# Patient Record
Sex: FEMALE | Race: WHITE | NOT HISPANIC OR LATINO | Employment: OTHER | URBAN - METROPOLITAN AREA
[De-identification: names, ages, dates, MRNs, and addresses within clinical notes are randomized per-mention and may not be internally consistent; named-entity substitution may affect disease eponyms.]

---

## 2023-02-28 LAB
ALBUMIN (G/DL) IN SER/PLAS: 4.2 G/DL (ref 3.4–5)
ANION GAP IN SER/PLAS: 10 MMOL/L (ref 10–20)
CALCIDIOL (25 OH VITAMIN D3) (NG/ML) IN SER/PLAS: 35 NG/ML
CALCIUM (MG/DL) IN SER/PLAS: 9.7 MG/DL (ref 8.6–10.6)
CARBON DIOXIDE, TOTAL (MMOL/L) IN SER/PLAS: 34 MMOL/L (ref 21–32)
CHLORIDE (MMOL/L) IN SER/PLAS: 104 MMOL/L (ref 98–107)
CREATININE (MG/DL) IN SER/PLAS: 1.29 MG/DL (ref 0.5–1.05)
CREATININE (MG/DL) IN URINE: 148 MG/DL (ref 20–320)
ERYTHROCYTE DISTRIBUTION WIDTH (RATIO) BY AUTOMATED COUNT: 13.2 % (ref 11.5–14.5)
ERYTHROCYTE MEAN CORPUSCULAR HEMOGLOBIN CONCENTRATION (G/DL) BY AUTOMATED: 33.3 G/DL (ref 32–36)
ERYTHROCYTE MEAN CORPUSCULAR VOLUME (FL) BY AUTOMATED COUNT: 98 FL (ref 80–100)
ERYTHROCYTES (10*6/UL) IN BLOOD BY AUTOMATED COUNT: 4.35 X10E12/L (ref 4–5.2)
GFR FEMALE: 40 ML/MIN/1.73M2
GLUCOSE (MG/DL) IN SER/PLAS: 95 MG/DL (ref 74–99)
HEMATOCRIT (%) IN BLOOD BY AUTOMATED COUNT: 42.7 % (ref 36–46)
HEMOGLOBIN (G/DL) IN BLOOD: 14.2 G/DL (ref 12–16)
LEUKOCYTES (10*3/UL) IN BLOOD BY AUTOMATED COUNT: 5.1 X10E9/L (ref 4.4–11.3)
MAGNESIUM (MG/DL) IN SER/PLAS: 2.03 MG/DL (ref 1.6–2.4)
NRBC (PER 100 WBCS) BY AUTOMATED COUNT: 0 /100 WBC (ref 0–0)
PARATHYRIN INTACT (PG/ML) IN SER/PLAS: 76 PG/ML (ref 18.5–88)
PHOSPHATE (MG/DL) IN SER/PLAS: 3.6 MG/DL (ref 2.5–4.9)
PLATELETS (10*3/UL) IN BLOOD AUTOMATED COUNT: 162 X10E9/L (ref 150–450)
POTASSIUM (MMOL/L) IN SER/PLAS: 4.5 MMOL/L (ref 3.5–5.3)
PROTEIN (MG/DL) IN URINE: 20 MG/DL (ref 5–24)
PROTEIN/CREATININE (MG/MG) IN URINE: 0.14 MG/MG CREAT (ref 0–0.17)
SODIUM (MMOL/L) IN SER/PLAS: 143 MMOL/L (ref 136–145)
UREA NITROGEN (MG/DL) IN SER/PLAS: 25 MG/DL (ref 6–23)

## 2023-06-02 ENCOUNTER — TELEPHONE (OUTPATIENT)
Dept: PRIMARY CARE | Facility: CLINIC | Age: 88
End: 2023-06-02
Payer: MEDICARE

## 2023-06-02 DIAGNOSIS — E03.9 HYPOTHYROIDISM, UNSPECIFIED TYPE: ICD-10-CM

## 2023-06-02 PROBLEM — R09.02 HYPOXIA: Status: ACTIVE | Noted: 2023-06-02

## 2023-06-02 PROBLEM — M25.811 SHOULDER IMPINGEMENT, RIGHT: Status: ACTIVE | Noted: 2023-06-02

## 2023-06-02 PROBLEM — I73.9 PERIPHERAL VASCULAR DISEASE (CMS-HCC): Status: ACTIVE | Noted: 2023-06-02

## 2023-06-02 PROBLEM — B34.9 VIRAL ILLNESS: Status: ACTIVE | Noted: 2023-06-02

## 2023-06-02 PROBLEM — J02.9 PHARYNGITIS: Status: ACTIVE | Noted: 2023-06-02

## 2023-06-02 PROBLEM — J01.00 ACUTE MAXILLARY SINUSITIS: Status: ACTIVE | Noted: 2023-06-02

## 2023-06-02 PROBLEM — U07.1 DISEASE DUE TO SEVERE ACUTE RESPIRATORY SYNDROME CORONAVIRUS 2 (SARS-COV-2): Status: ACTIVE | Noted: 2023-06-02

## 2023-06-02 PROBLEM — I48.91 ATRIAL FIBRILLATION (MULTI): Status: ACTIVE | Noted: 2023-06-02

## 2023-06-02 PROBLEM — M81.0 OSTEOPOROSIS: Status: ACTIVE | Noted: 2023-06-02

## 2023-06-02 PROBLEM — R60.0 LOCALIZED EDEMA: Status: ACTIVE | Noted: 2023-06-02

## 2023-06-02 PROBLEM — R06.09 DYSPNEA ON MINIMAL EXERTION: Status: ACTIVE | Noted: 2023-06-02

## 2023-06-02 PROBLEM — N39.0 ACUTE UTI: Status: ACTIVE | Noted: 2023-06-02

## 2023-06-02 PROBLEM — R51.9 PERSISTENT HEADACHES: Status: ACTIVE | Noted: 2023-06-02

## 2023-06-02 PROBLEM — G56.01 CARPAL TUNNEL SYNDROME OF RIGHT WRIST: Status: ACTIVE | Noted: 2023-06-02

## 2023-06-02 PROBLEM — I80.9 PHLEBITIS: Status: ACTIVE | Noted: 2023-06-02

## 2023-06-02 PROBLEM — M17.10 ARTHRITIS OF KNEE: Status: ACTIVE | Noted: 2023-06-02

## 2023-06-02 PROBLEM — R53.1 WEAKNESS: Status: ACTIVE | Noted: 2023-06-02

## 2023-06-02 PROBLEM — N18.32 STAGE 3B CHRONIC KIDNEY DISEASE (MULTI): Status: ACTIVE | Noted: 2023-06-02

## 2023-06-02 PROBLEM — R79.89 TSH ELEVATION: Status: ACTIVE | Noted: 2023-06-02

## 2023-06-02 PROBLEM — I10 ESSENTIAL HYPERTENSION: Status: ACTIVE | Noted: 2023-06-02

## 2023-06-02 PROBLEM — M25.552 LEFT HIP PAIN: Status: ACTIVE | Noted: 2023-06-02

## 2023-06-02 PROBLEM — N28.9 ABNORMAL KIDNEY FUNCTION: Status: ACTIVE | Noted: 2023-06-02

## 2023-06-02 PROBLEM — A04.72 C. DIFFICILE COLITIS: Status: ACTIVE | Noted: 2023-06-02

## 2023-06-02 PROBLEM — R05.9 COUGH: Status: ACTIVE | Noted: 2023-06-02

## 2023-06-02 PROBLEM — E78.5 HYPERLIPIDEMIA: Status: ACTIVE | Noted: 2023-06-02

## 2023-06-02 RX ORDER — ATORVASTATIN CALCIUM 10 MG/1
10 TABLET, FILM COATED ORAL DAILY
COMMUNITY
End: 2023-07-31 | Stop reason: SDUPTHER

## 2023-06-02 RX ORDER — LEVOTHYROXINE SODIUM 50 UG/1
50 TABLET ORAL DAILY
COMMUNITY
End: 2023-06-02 | Stop reason: SDUPTHER

## 2023-06-02 RX ORDER — ONDANSETRON 4 MG/1
TABLET, FILM COATED ORAL
COMMUNITY
Start: 2017-12-06 | End: 2023-12-05 | Stop reason: ALTCHOICE

## 2023-06-02 RX ORDER — FUROSEMIDE 40 MG/1
1 TABLET ORAL DAILY
COMMUNITY
Start: 2023-04-24

## 2023-06-02 RX ORDER — FOLIC ACID 1 MG/1
1 TABLET ORAL DAILY
COMMUNITY
End: 2023-07-31 | Stop reason: SDUPTHER

## 2023-06-02 RX ORDER — MULTIVITAMIN
1 TABLET ORAL DAILY
COMMUNITY
Start: 2019-10-07

## 2023-06-02 RX ORDER — MOLNUPIRAVIR 200 MG/1
CAPSULE ORAL
COMMUNITY
Start: 2022-09-28 | End: 2023-07-10 | Stop reason: ALTCHOICE

## 2023-06-02 RX ORDER — RIVAROXABAN 20 MG/1
1 TABLET, FILM COATED ORAL DAILY
COMMUNITY
Start: 2014-07-15 | End: 2023-07-10 | Stop reason: SDUPTHER

## 2023-06-02 RX ORDER — BETAMETHASONE DIPROPIONATE 0.5 MG/G
OINTMENT TOPICAL
COMMUNITY
Start: 2017-08-22

## 2023-06-02 RX ORDER — ACETAMINOPHEN 500 MG
1 TABLET ORAL DAILY
COMMUNITY
Start: 2019-10-07

## 2023-06-02 RX ORDER — AMLODIPINE BESYLATE 5 MG/1
5 TABLET ORAL DAILY
COMMUNITY
End: 2023-07-31 | Stop reason: SDUPTHER

## 2023-06-02 RX ORDER — LEVOTHYROXINE SODIUM 50 UG/1
50 TABLET ORAL DAILY
Qty: 90 TABLET | Refills: 1 | Status: SHIPPED | OUTPATIENT
Start: 2023-06-02 | End: 2023-08-29 | Stop reason: SDUPTHER

## 2023-06-02 RX ORDER — HYDROCODONE BITARTRATE AND ACETAMINOPHEN 5; 325 MG/1; MG/1
TABLET ORAL
COMMUNITY
Start: 2023-03-30 | End: 2023-07-10 | Stop reason: ALTCHOICE

## 2023-06-02 NOTE — TELEPHONE ENCOUNTER
Levothryoxine .05 mg 1 x a day  [facility] [address]  [facility] [phone]

## 2023-07-10 ENCOUNTER — OFFICE VISIT (OUTPATIENT)
Dept: PRIMARY CARE | Facility: CLINIC | Age: 88
End: 2023-07-10
Payer: MEDICARE

## 2023-07-10 VITALS
BODY MASS INDEX: 24.49 KG/M2 | RESPIRATION RATE: 18 BRPM | DIASTOLIC BLOOD PRESSURE: 68 MMHG | OXYGEN SATURATION: 96 % | WEIGHT: 147 LBS | HEART RATE: 65 BPM | HEIGHT: 65 IN | SYSTOLIC BLOOD PRESSURE: 130 MMHG

## 2023-07-10 DIAGNOSIS — G47.09 OTHER INSOMNIA: ICD-10-CM

## 2023-07-10 DIAGNOSIS — F41.9 ANXIETY: Primary | ICD-10-CM

## 2023-07-10 PROBLEM — J01.00 ACUTE MAXILLARY SINUSITIS: Status: RESOLVED | Noted: 2023-06-02 | Resolved: 2023-07-10

## 2023-07-10 PROBLEM — R06.09 DYSPNEA ON MINIMAL EXERTION: Status: RESOLVED | Noted: 2023-06-02 | Resolved: 2023-07-10

## 2023-07-10 PROBLEM — N39.0 ACUTE UTI: Status: RESOLVED | Noted: 2023-06-02 | Resolved: 2023-07-10

## 2023-07-10 PROBLEM — R05.9 COUGH: Status: RESOLVED | Noted: 2023-06-02 | Resolved: 2023-07-10

## 2023-07-10 PROBLEM — I10 ESSENTIAL HYPERTENSION: Status: RESOLVED | Noted: 2023-06-02 | Resolved: 2023-07-10

## 2023-07-10 PROBLEM — A04.72 C. DIFFICILE COLITIS: Status: RESOLVED | Noted: 2023-06-02 | Resolved: 2023-07-10

## 2023-07-10 PROCEDURE — 99213 OFFICE O/P EST LOW 20 MIN: CPT | Performed by: FAMILY MEDICINE

## 2023-07-10 PROCEDURE — 1159F MED LIST DOCD IN RCRD: CPT | Performed by: FAMILY MEDICINE

## 2023-07-10 PROCEDURE — 1160F RVW MEDS BY RX/DR IN RCRD: CPT | Performed by: FAMILY MEDICINE

## 2023-07-10 PROCEDURE — 1036F TOBACCO NON-USER: CPT | Performed by: FAMILY MEDICINE

## 2023-07-10 RX ORDER — HYDROXYZINE HYDROCHLORIDE 25 MG/1
25 TABLET, FILM COATED ORAL NIGHTLY
Qty: 7 TABLET | Refills: 0 | Status: SHIPPED | OUTPATIENT
Start: 2023-07-10 | End: 2023-12-05 | Stop reason: ALTCHOICE

## 2023-07-10 ASSESSMENT — ENCOUNTER SYMPTOMS
HALLUCINATIONS: 0
WEAKNESS: 0
EYE PAIN: 0
DIAPHORESIS: 0
CARDIOVASCULAR NEGATIVE: 1
RESPIRATORY NEGATIVE: 1
LIGHT-HEADEDNESS: 0
DIZZINESS: 0
NERVOUS/ANXIOUS: 1
SLEEPING PROBLEMS DURING THE LAST MONTH: 1
CONFUSION: 0
FATIGUE: 0
EYE DISCHARGE: 0
AGITATION: 0
CHILLS: 0

## 2023-07-10 ASSESSMENT — PATIENT HEALTH QUESTIONNAIRE - PHQ9
2. FEELING DOWN, DEPRESSED OR HOPELESS: NOT AT ALL
1. LITTLE INTEREST OR PLEASURE IN DOING THINGS: NOT AT ALL
SUM OF ALL RESPONSES TO PHQ9 QUESTIONS 1 & 2: 0

## 2023-07-10 NOTE — PATIENT INSTRUCTIONS
Discussed helping her daughter.     Trying to meet with primary care physician for her.    We will use Atarax as needed.    Recommend counseling if not improving.    If any troubles with increased anxiety or depression or other change, please call and let me know.

## 2023-07-10 NOTE — PROGRESS NOTES
"Subjective   Patient ID: Alexia Amaya is a 88 y.o. female who presents for Sleeping Problem (Daughters health status is effecting patients sleep).    Patient having difficult time with anxiety and with insomnia.  Daughter has been doing poorly she is an alcoholic smoking excessively not eating well.    This has been very bothersome and is affected her sleep.    She has had no significant depression no thoughts of suicide or self-harm states that sometimes she just cannot rest well gets approximately 5 hours at night.    She does not drink any alcohol.    Sleeping Problem  Pertinent negatives include no chills, diaphoresis, fatigue or weakness.    patient presents having troubles with sleep some troubles with anxiety.    Hard time with daughter.        Review of Systems   Constitutional:  Negative for chills, diaphoresis and fatigue.   Eyes:  Negative for pain and discharge.   Respiratory: Negative.     Cardiovascular: Negative.    Neurological:  Negative for dizziness, weakness and light-headedness.   Psychiatric/Behavioral:  Negative for agitation, confusion, hallucinations and suicidal ideas. The patient is nervous/anxious.        Objective   /68 (BP Location: Right arm, Patient Position: Sitting, BP Cuff Size: Adult)   Pulse 65   Resp 18   Ht 1.651 m (5' 5\")   Wt 66.7 kg (147 lb)   SpO2 96%   BMI 24.46 kg/m²   BSA Body surface area is 1.75 meters squared.      Physical Exam  Constitutional:       Appearance: Normal appearance.   Cardiovascular:      Rate and Rhythm: Normal rate.      Pulses: Normal pulses.   Pulmonary:      Effort: Pulmonary effort is normal.   Abdominal:      General: Abdomen is flat.   Neurological:      Mental Status: She is alert.   Psychiatric:         Mood and Affect: Mood normal.         Thought Content: Thought content normal.         Judgment: Judgment normal.       Orders Only on 02/28/2023   Component Date Value Ref Range Status    Protein, Ur 02/28/2023 20  5 - 24 mg/dL " Final    Creatinine, Urine 02/28/2023 148.0  20.0 - 320.0 mg/dL Final    Prot/Creat, Ur 02/28/2023 0.14  0.00 - 0.17 mg/mg Creat Final   Orders Only on 02/28/2023   Component Date Value Ref Range Status    Glucose 02/28/2023 95  74 - 99 mg/dL Final    Sodium 02/28/2023 143  136 - 145 mmol/L Final    Potassium 02/28/2023 4.5  3.5 - 5.3 mmol/L Final    Chloride 02/28/2023 104  98 - 107 mmol/L Final    Bicarbonate 02/28/2023 34 (H)  21 - 32 mmol/L Final    Anion Gap 02/28/2023 10  10 - 20 mmol/L Final    Urea Nitrogen 02/28/2023 25 (H)  6 - 23 mg/dL Final    Creatinine 02/28/2023 1.29 (H)  0.50 - 1.05 mg/dL Final    GFR Female 02/28/2023 40 (A)  >90 mL/min/1.73m2 Final    Comment:  CALCULATIONS OF ESTIMATED GFR ARE PERFORMED   USING THE 2021 CKD-EPI STUDY REFIT EQUATION   WITHOUT THE RACE VARIABLE FOR THE IDMS-TRACEABLE   CREATININE METHODS.    https://jasn.asnjournals.org/content/early/2021/09/22/ASN.8608721928      Calcium 02/28/2023 9.7  8.6 - 10.6 mg/dL Final    Phosphorus 02/28/2023 3.6  2.5 - 4.9 mg/dL Final    Comment:  The performance characteristics of phosphorus testing in   heparinized plasma have been validated by the individual     laboratory site where testing is performed. Testing    on heparinized plasma is not approved by the FDA;    however, such approval is not necessary.      Albumin 02/28/2023 4.2  3.4 - 5.0 g/dL Final   Orders Only on 02/28/2023   Component Date Value Ref Range Status    WBC 02/28/2023 5.1  4.4 - 11.3 x10E9/L Final    nRBC 02/28/2023 0.0  0.0 - 0.0 /100 WBC Final    RBC 02/28/2023 4.35  4.00 - 5.20 x10E12/L Final    Hemoglobin 02/28/2023 14.2  12.0 - 16.0 g/dL Final    Hematocrit 02/28/2023 42.7  36.0 - 46.0 % Final    MCV 02/28/2023 98  80 - 100 fL Final    MCHC 02/28/2023 33.3  32.0 - 36.0 g/dL Final    Platelets 02/28/2023 162  150 - 450 x10E9/L Final    RDW 02/28/2023 13.2  11.5 - 14.5 % Final   Orders Only on 02/28/2023   Component Date Value Ref Range Status    Magnesium  02/28/2023 2.03  1.60 - 2.40 mg/dL Final   Orders Only on 02/28/2023   Component Date Value Ref Range Status    PTH 02/28/2023 76.0  18.5 - 88.0 pg/mL Final   Orders Only on 02/28/2023   Component Date Value Ref Range Status    Vitamin D, 25-Hydroxy 02/28/2023 35  ng/mL Final    Comment: .  DEFICIENCY:         < 20   NG/ML  INSUFFICIENCY:      20-29  NG/ML  SUFFICIENCY:         NG/ML    THIS ASSAY ACCURATELY QUANTIFIES THE SUM OF  VITAMIN D3, 25-HYDROXY AND VIT D2,25-HYDROXY.     [facility]acy Encounter on 10/18/2022   Component Date Value Ref Range Status    Glucose 10/18/2022 95  74 - 99 mg/dL Final    Sodium 10/18/2022 143  136 - 145 mmol/L Final    Potassium 10/18/2022 4.9  3.5 - 5.3 mmol/L Final    Chloride 10/18/2022 105  98 - 107 mmol/L Final    Bicarbonate 10/18/2022 29  21 - 32 mmol/L Final    Anion Gap 10/18/2022 14  10 - 20 mmol/L Final    Urea Nitrogen 10/18/2022 25 (H)  6 - 23 mg/dL Final    Creatinine 10/18/2022 1.47 (H)  0.50 - 1.05 mg/dL Final    GFR Female 10/18/2022 34 (A)  >90 mL/min/1.73m2 Final    Comment:  CALCULATIONS OF ESTIMATED GFR ARE PERFORMED   USING THE 2021 CKD-EPI STUDY REFIT EQUATION   WITHOUT THE RACE VARIABLE FOR THE IDMS-TRACEABLE   CREATININE METHODS.    https://jasn.asnjournals.org/content/early/2021/09/22/ASN.3072117828      Calcium 10/18/2022 9.7  8.6 - 10.6 mg/dL Final    Albumin 10/18/2022 4.2  3.4 - 5.0 g/dL Final    Alkaline Phosphatase 10/18/2022 81  33 - 136 U/L Final    Total Protein 10/18/2022 7.0  6.4 - 8.2 g/dL Final    AST 10/18/2022 26  9 - 39 U/L Final    Total Bilirubin 10/18/2022 1.1  0.0 - 1.2 mg/dL Final    ALT (SGPT) 10/18/2022 15  7 - 45 U/L Final    Comment:  Patients treated with Sulfasalazine may generate    falsely decreased results for ALT.      Cholesterol 10/18/2022 176  0 - 199 mg/dL Final    Comment: .      AGE      DESIRABLE   BORDERLINE HIGH   HIGH     0-19 Y     0 - 169       170 - 199     >/= 200    20-24 Y     0 - 189       190 - 224     >/= 225          >24 Y     0 - 199       200 - 239     >/= 240   **All ranges are based on fasting samples. Specific   therapeutic targets will vary based on patient-specific   cardiac risk.  .   Pediatric guidelines reference:Pediatrics 2011, 128(S5).   Adult guidelines reference: NCEP ATPIII Guidelines,     JESIKA 2001, 258:2486-97  .   Venipuncture immediately after or during the    administration of Metamizole may lead to falsely   low results. Testing should be performed immediately   prior to Metamizole dosing.      HDL 10/18/2022 83.5  mg/dL Final    Comment: .      AGE      VERY LOW   LOW     NORMAL    HIGH       0-19 Y       < 35   < 40     40-45     ----    20-24 Y       ----   < 40       >45     ----      >24 Y       ----   < 40     40-60      >60  .      Cholesterol/HDL Ratio 10/18/2022 2.1   Final    Comment: REF VALUES  DESIRABLE  < 3.4  HIGH RISK  > 5.0      LDL 10/18/2022 77  0 - 99 mg/dL Final    Comment: .                           NEAR      BORD      AGE      DESIRABLE  OPTIMAL    HIGH     HIGH     VERY HIGH     0-19 Y     0 - 109     ---    110-129   >/= 130     ----    20-24 Y     0 - 119     ---    120-159   >/= 160     ----      >24 Y     0 -  99   100-129  130-159   160-189     >/=190  .      VLDL 10/18/2022 16  0 - 40 mg/dL Final    Triglycerides 10/18/2022 79  0 - 149 mg/dL Final    Comment: .      AGE      DESIRABLE   BORDERLINE HIGH   HIGH     VERY HIGH   0 D-90 D    19 - 174         ----         ----        ----  91 D- 9 Y     0 -  74        75 -  99     >/= 100      ----    10-19 Y     0 -  89        90 - 129     >/= 130      ----    20-24 Y     0 - 114       115 - 149     >/= 150      ----         >24 Y     0 - 149       150 - 199    200- 499    >/= 500  .   Venipuncture immediately after or during the    administration of Metamizole may lead to falsely   low results. Testing should be performed immediately   prior to Metamizole dosing.      WBC 10/18/2022 4.0 (L)  4.4 - 11.3 x10E9/L Final     nRBC 10/18/2022 0.0  0.0 - 0.0 /100 WBC Final    RBC 10/18/2022 4.28  4.00 - 5.20 x10E12/L Final    Hemoglobin 10/18/2022 14.1  12.0 - 16.0 g/dL Final    Hematocrit 10/18/2022 43.7  36.0 - 46.0 % Final    MCV 10/18/2022 102 (H)  80 - 100 fL Final    MCHC 10/18/2022 32.3  32.0 - 36.0 g/dL Final    Platelets 10/18/2022 156  150 - 450 x10E9/L Final    RDW 10/18/2022 13.4  11.5 - 14.5 % Final    Neutrophils % 10/18/2022 40.9  40.0 - 80.0 % Final    Immature Granulocytes %, Automated 10/18/2022 0.2  0.0 - 0.9 % Final    Comment:  Immature Granulocyte Count (IG) includes promyelocytes,    myelocytes and metamyelocytes but does not include bands.   Percent differential counts (%) should be interpreted in the   context of the absolute cell counts (cells/L).      Lymphocytes % 10/18/2022 36.1  13.0 - 44.0 % Final    Monocytes % 10/18/2022 11.4  2.0 - 10.0 % Final    Eosinophils % 10/18/2022 10.2  0.0 - 6.0 % Final    Basophils % 10/18/2022 1.2  0.0 - 2.0 % Final    Neutrophils Absolute 10/18/2022 1.64  1.60 - 5.50 x10E9/L Final    Lymphocytes Absolute 10/18/2022 1.45  0.80 - 3.00 x10E9/L Final    Monocytes Absolute 10/18/2022 0.46  0.05 - 0.80 x10E9/L Final    Eosinophils Absolute 10/18/2022 0.41 (H)  0.00 - 0.40 x10E9/L Final    Basophils Absolute 10/18/2022 0.05  0.00 - 0.10 x10E9/L Final    TSH 10/18/2022 3.32  0.44 - 3.98 mIU/L Final    Comment:  TSH testing is performed using different testing    methodology at [facility] than at other    [facility]. Direct result comparisons should    only be made within the same method.       Current Outpatient Medications on File Prior to Visit   Medication Sig Dispense Refill    amLODIPine (Norvasc) 5 mg tablet Take 1 tablet (5 mg) by mouth once daily.      atorvastatin (Lipitor) 10 mg tablet Take 1 tablet (10 mg) by mouth once daily.      betamethasone dipropionate (Diprolene) 0.05 % ointment APPLY SPARINGLY EXTERNALLY TO THE AFFECTED AREA TWICE DAILY       calcium carbonate-vitamin D3 600 mg-20 mcg (800 unit) tablet Take 1 tablet by mouth once daily.      folic acid (Folvite) 1 mg tablet Take 1 tablet (1 mg) by mouth once daily.      furosemide (Lasix) 40 mg tablet Take 1 tablet (40 mg) by mouth once daily.      levothyroxine (Synthroid, Levoxyl) 50 mcg tablet Take 1 tablet (50 mcg) by mouth once daily. 90 tablet 1    multivitamin (Multiple Vitamins) tablet Take 1 tablet by mouth once daily.      ondansetron (Zofran) 4 mg tablet Take by mouth.      rivaroxaban (Xarelto) 20 mg tablet Take 1 tablet (20 mg) by mouth once daily.      [DISCONTINUED] HYDROcodone-acetaminophen (Norco) 5-325 mg tablet TAKE 1 TO 2 TABLETS BY MOUTH EVERY FOUR  HOURS AS NEEDED FOR PAIN * DO NOT EXCEED 8 TABLETS IN 24 HOURS *      [DISCONTINUED] Lagevrio, EUA, 200 mg capsule Take by mouth.      [DISCONTINUED] Xarelto 20 mg tablet Take 1 tablet (20 mg) by mouth once daily.       No current facility-administered medications on file prior to visit.     No images are attached to the encounter.            Assessment/Plan   Problem List Items Addressed This Visit       Anxiety - Primary    Other insomnia    Relevant Medications    hydrOXYzine HCL (Atarax) 25 mg tablet

## 2023-07-31 DIAGNOSIS — I10 ESSENTIAL HYPERTENSION: ICD-10-CM

## 2023-07-31 DIAGNOSIS — E78.5 HYPERLIPIDEMIA, UNSPECIFIED HYPERLIPIDEMIA TYPE: ICD-10-CM

## 2023-07-31 DIAGNOSIS — Z00.00 HEALTH MAINTENANCE EXAMINATION: ICD-10-CM

## 2023-07-31 PROBLEM — I44.0 FIRST DEGREE ATRIOVENTRICULAR BLOCK: Status: ACTIVE | Noted: 2023-07-31

## 2023-07-31 PROBLEM — I48.21 PERMANENT ATRIAL FIBRILLATION (MULTI): Status: ACTIVE | Noted: 2023-06-02

## 2023-07-31 PROBLEM — I45.2 RIGHT BUNDLE BRANCH BLOCK WITH LEFT ANTERIOR FASCICULAR BLOCK: Status: ACTIVE | Noted: 2023-07-31

## 2023-07-31 PROBLEM — Z95.0 PRESENCE OF CARDIAC PACEMAKER: Status: ACTIVE | Noted: 2023-07-31

## 2023-07-31 PROBLEM — Z98.890 HISTORY OF ATRIOVENTRICULAR NODAL ABLATION: Status: ACTIVE | Noted: 2021-04-06

## 2023-07-31 RX ORDER — ATORVASTATIN CALCIUM 10 MG/1
10 TABLET, FILM COATED ORAL DAILY
Qty: 90 TABLET | Refills: 0 | Status: SHIPPED | OUTPATIENT
Start: 2023-07-31 | End: 2023-10-30

## 2023-07-31 RX ORDER — FOLIC ACID 1 MG/1
1 TABLET ORAL DAILY
Qty: 90 TABLET | Refills: 0 | Status: SHIPPED | OUTPATIENT
Start: 2023-07-31 | End: 2023-10-30

## 2023-07-31 RX ORDER — CA/D3/MAG OX/ZINC/COP/MANG/BOR 600 MG-800
1 TABLET,CHEWABLE ORAL
COMMUNITY

## 2023-07-31 RX ORDER — AMMONIUM LACTATE 12 G/100G
1 CREAM TOPICAL
COMMUNITY
Start: 2016-04-28

## 2023-07-31 RX ORDER — AMLODIPINE BESYLATE 5 MG/1
5 TABLET ORAL DAILY
Qty: 90 TABLET | Refills: 0 | Status: SHIPPED | OUTPATIENT
Start: 2023-07-31 | End: 2023-10-30

## 2023-08-29 DIAGNOSIS — E03.9 HYPOTHYROIDISM, UNSPECIFIED TYPE: ICD-10-CM

## 2023-08-29 RX ORDER — LEVOTHYROXINE SODIUM 50 UG/1
50 TABLET ORAL DAILY
Qty: 90 TABLET | Refills: 1 | Status: SHIPPED | OUTPATIENT
Start: 2023-08-29 | End: 2023-11-30

## 2023-09-07 LAB
ALBUMIN (G/DL) IN SER/PLAS: 4.4 G/DL (ref 3.4–5)
ALBUMIN (MG/L) IN URINE: 10.2 MG/L
ALBUMIN/CREATININE (UG/MG) IN URINE: 31.1 UG/MG CRT (ref 0–30)
ANION GAP IN SER/PLAS: 11 MMOL/L (ref 10–20)
CALCIDIOL (25 OH VITAMIN D3) (NG/ML) IN SER/PLAS: 44 NG/ML
CALCIUM (MG/DL) IN SER/PLAS: 9.9 MG/DL (ref 8.6–10.6)
CARBON DIOXIDE, TOTAL (MMOL/L) IN SER/PLAS: 30 MMOL/L (ref 21–32)
CHLORIDE (MMOL/L) IN SER/PLAS: 103 MMOL/L (ref 98–107)
CREATININE (MG/DL) IN SER/PLAS: 1.3 MG/DL (ref 0.5–1.05)
CREATININE (MG/DL) IN URINE: 32.8 MG/DL (ref 20–320)
ERYTHROCYTE DISTRIBUTION WIDTH (RATIO) BY AUTOMATED COUNT: 13.7 % (ref 11.5–14.5)
ERYTHROCYTE MEAN CORPUSCULAR HEMOGLOBIN CONCENTRATION (G/DL) BY AUTOMATED: 32.7 G/DL (ref 32–36)
ERYTHROCYTE MEAN CORPUSCULAR VOLUME (FL) BY AUTOMATED COUNT: 100 FL (ref 80–100)
ERYTHROCYTES (10*6/UL) IN BLOOD BY AUTOMATED COUNT: 4.23 X10E12/L (ref 4–5.2)
GFR FEMALE: 40 ML/MIN/1.73M2
GLUCOSE (MG/DL) IN SER/PLAS: 107 MG/DL (ref 74–99)
HEMATOCRIT (%) IN BLOOD BY AUTOMATED COUNT: 42.2 % (ref 36–46)
HEMOGLOBIN (G/DL) IN BLOOD: 13.8 G/DL (ref 12–16)
LEUKOCYTES (10*3/UL) IN BLOOD BY AUTOMATED COUNT: 5.8 X10E9/L (ref 4.4–11.3)
MAGNESIUM (MG/DL) IN SER/PLAS: 2.1 MG/DL (ref 1.6–2.4)
NRBC (PER 100 WBCS) BY AUTOMATED COUNT: 0 /100 WBC (ref 0–0)
PHOSPHATE (MG/DL) IN SER/PLAS: 4.1 MG/DL (ref 2.5–4.9)
PLATELETS (10*3/UL) IN BLOOD AUTOMATED COUNT: 173 X10E9/L (ref 150–450)
POTASSIUM (MMOL/L) IN SER/PLAS: 4.4 MMOL/L (ref 3.5–5.3)
SODIUM (MMOL/L) IN SER/PLAS: 140 MMOL/L (ref 136–145)
UREA NITROGEN (MG/DL) IN SER/PLAS: 27 MG/DL (ref 6–23)

## 2023-09-08 LAB — PARATHYRIN INTACT (PG/ML) IN SER/PLAS: 53.1 PG/ML (ref 18.5–88)

## 2023-10-09 ENCOUNTER — TELEPHONE (OUTPATIENT)
Dept: PRIMARY CARE | Facility: CLINIC | Age: 88
End: 2023-10-09
Payer: MEDICARE

## 2023-10-10 DIAGNOSIS — E03.9 HYPOTHYROIDISM, UNSPECIFIED TYPE: ICD-10-CM

## 2023-10-10 DIAGNOSIS — E78.5 HYPERLIPIDEMIA, UNSPECIFIED HYPERLIPIDEMIA TYPE: ICD-10-CM

## 2023-10-30 DIAGNOSIS — E78.5 HYPERLIPIDEMIA, UNSPECIFIED HYPERLIPIDEMIA TYPE: ICD-10-CM

## 2023-10-30 DIAGNOSIS — I10 ESSENTIAL HYPERTENSION: ICD-10-CM

## 2023-10-30 DIAGNOSIS — Z00.00 HEALTH MAINTENANCE EXAMINATION: ICD-10-CM

## 2023-10-30 RX ORDER — FOLIC ACID 1 MG/1
1 TABLET ORAL DAILY
Qty: 90 TABLET | Refills: 0 | Status: SHIPPED | OUTPATIENT
Start: 2023-10-30 | End: 2023-12-05 | Stop reason: SDUPTHER

## 2023-10-30 RX ORDER — AMLODIPINE BESYLATE 5 MG/1
5 TABLET ORAL DAILY
Qty: 90 TABLET | Refills: 0 | Status: SHIPPED | OUTPATIENT
Start: 2023-10-30 | End: 2023-12-05 | Stop reason: SDUPTHER

## 2023-10-30 RX ORDER — ATORVASTATIN CALCIUM 10 MG/1
10 TABLET, FILM COATED ORAL DAILY
Qty: 90 TABLET | Refills: 0 | Status: SHIPPED | OUTPATIENT
Start: 2023-10-30 | End: 2023-12-05 | Stop reason: SDUPTHER

## 2023-11-29 ENCOUNTER — LAB (OUTPATIENT)
Dept: LAB | Facility: LAB | Age: 88
End: 2023-11-29
Payer: MEDICARE

## 2023-11-29 DIAGNOSIS — E78.5 HYPERLIPIDEMIA, UNSPECIFIED HYPERLIPIDEMIA TYPE: ICD-10-CM

## 2023-11-29 DIAGNOSIS — E03.9 HYPOTHYROIDISM, UNSPECIFIED TYPE: ICD-10-CM

## 2023-11-29 LAB
ALBUMIN SERPL BCP-MCNC: 4.2 G/DL (ref 3.4–5)
ALP SERPL-CCNC: 62 U/L (ref 33–136)
ALT SERPL W P-5'-P-CCNC: 15 U/L (ref 7–45)
ANION GAP SERPL CALC-SCNC: 21 MMOL/L (ref 10–20)
AST SERPL W P-5'-P-CCNC: 30 U/L (ref 9–39)
BASOPHILS # BLD AUTO: 0.04 X10*3/UL (ref 0–0.1)
BASOPHILS NFR BLD AUTO: 1 %
BILIRUB SERPL-MCNC: 0.9 MG/DL (ref 0–1.2)
BUN SERPL-MCNC: 20 MG/DL (ref 6–23)
CALCIUM SERPL-MCNC: 9.5 MG/DL (ref 8.6–10.6)
CHLORIDE SERPL-SCNC: 104 MMOL/L (ref 98–107)
CHOLEST SERPL-MCNC: 180 MG/DL (ref 0–199)
CHOLESTEROL/HDL RATIO: 2.1
CO2 SERPL-SCNC: 19 MMOL/L (ref 21–32)
CREAT SERPL-MCNC: 1.32 MG/DL (ref 0.5–1.05)
EOSINOPHIL # BLD AUTO: 0.23 X10*3/UL (ref 0–0.4)
EOSINOPHIL NFR BLD AUTO: 5.9 %
ERYTHROCYTE [DISTWIDTH] IN BLOOD BY AUTOMATED COUNT: 13.3 % (ref 11.5–14.5)
GFR SERPL CREATININE-BSD FRML MDRD: 39 ML/MIN/1.73M*2
GLUCOSE SERPL-MCNC: 92 MG/DL (ref 74–99)
HCT VFR BLD AUTO: 43.9 % (ref 36–46)
HDLC SERPL-MCNC: 86 MG/DL
HGB BLD-MCNC: 13.6 G/DL (ref 12–16)
IMM GRANULOCYTES # BLD AUTO: 0.01 X10*3/UL (ref 0–0.5)
IMM GRANULOCYTES NFR BLD AUTO: 0.3 % (ref 0–0.9)
LDLC SERPL CALC-MCNC: 80 MG/DL
LYMPHOCYTES # BLD AUTO: 1.28 X10*3/UL (ref 0.8–3)
LYMPHOCYTES NFR BLD AUTO: 32.9 %
MCH RBC QN AUTO: 33.3 PG (ref 26–34)
MCHC RBC AUTO-ENTMCNC: 31 G/DL (ref 32–36)
MCV RBC AUTO: 107 FL (ref 80–100)
MONOCYTES # BLD AUTO: 0.37 X10*3/UL (ref 0.05–0.8)
MONOCYTES NFR BLD AUTO: 9.5 %
NEUTROPHILS # BLD AUTO: 1.96 X10*3/UL (ref 1.6–5.5)
NEUTROPHILS NFR BLD AUTO: 50.4 %
NON HDL CHOLESTEROL: 94 MG/DL (ref 0–149)
NRBC BLD-RTO: 0 /100 WBCS (ref 0–0)
PLATELET # BLD AUTO: 146 X10*3/UL (ref 150–450)
POTASSIUM SERPL-SCNC: 4.4 MMOL/L (ref 3.5–5.3)
PROT SERPL-MCNC: 6.7 G/DL (ref 6.4–8.2)
RBC # BLD AUTO: 4.09 X10*6/UL (ref 4–5.2)
SODIUM SERPL-SCNC: 140 MMOL/L (ref 136–145)
TRIGL SERPL-MCNC: 70 MG/DL (ref 0–149)
TSH SERPL-ACNC: 3.39 MIU/L (ref 0.44–3.98)
VLDL: 14 MG/DL (ref 0–40)
WBC # BLD AUTO: 3.9 X10*3/UL (ref 4.4–11.3)

## 2023-11-29 PROCEDURE — 80061 LIPID PANEL: CPT

## 2023-11-29 PROCEDURE — 36415 COLL VENOUS BLD VENIPUNCTURE: CPT

## 2023-11-29 PROCEDURE — 85025 COMPLETE CBC W/AUTO DIFF WBC: CPT

## 2023-11-29 PROCEDURE — 84443 ASSAY THYROID STIM HORMONE: CPT

## 2023-11-29 PROCEDURE — 80053 COMPREHEN METABOLIC PANEL: CPT

## 2023-11-30 DIAGNOSIS — E03.9 HYPOTHYROIDISM, UNSPECIFIED TYPE: ICD-10-CM

## 2023-11-30 RX ORDER — LEVOTHYROXINE SODIUM 50 UG/1
50 TABLET ORAL DAILY
Qty: 90 TABLET | Refills: 1 | Status: SHIPPED | OUTPATIENT
Start: 2023-11-30 | End: 2023-12-05 | Stop reason: SDUPTHER

## 2023-12-05 ENCOUNTER — LAB (OUTPATIENT)
Dept: LAB | Facility: LAB | Age: 88
End: 2023-12-05
Payer: MEDICARE

## 2023-12-05 ENCOUNTER — OFFICE VISIT (OUTPATIENT)
Dept: PRIMARY CARE | Facility: CLINIC | Age: 88
End: 2023-12-05
Payer: MEDICARE

## 2023-12-05 VITALS
SYSTOLIC BLOOD PRESSURE: 126 MMHG | WEIGHT: 147 LBS | RESPIRATION RATE: 16 BRPM | DIASTOLIC BLOOD PRESSURE: 79 MMHG | OXYGEN SATURATION: 94 % | BODY MASS INDEX: 24.49 KG/M2 | TEMPERATURE: 97.6 F | HEART RATE: 70 BPM | HEIGHT: 65 IN

## 2023-12-05 DIAGNOSIS — Z00.00 HEALTH MAINTENANCE EXAMINATION: ICD-10-CM

## 2023-12-05 DIAGNOSIS — Z00.00 MEDICARE ANNUAL WELLNESS VISIT, SUBSEQUENT: Primary | ICD-10-CM

## 2023-12-05 DIAGNOSIS — D69.6 THROMBOCYTOPENIA (CMS-HCC): ICD-10-CM

## 2023-12-05 DIAGNOSIS — N18.32 STAGE 3B CHRONIC KIDNEY DISEASE (MULTI): ICD-10-CM

## 2023-12-05 DIAGNOSIS — E03.9 HYPOTHYROIDISM, UNSPECIFIED TYPE: ICD-10-CM

## 2023-12-05 DIAGNOSIS — Z95.0 PRESENCE OF CARDIAC PACEMAKER: ICD-10-CM

## 2023-12-05 DIAGNOSIS — Z71.89 COUNSELING ON HEALTH PROMOTION AND DISEASE PREVENTION: ICD-10-CM

## 2023-12-05 DIAGNOSIS — E78.5 HYPERLIPIDEMIA, UNSPECIFIED HYPERLIPIDEMIA TYPE: ICD-10-CM

## 2023-12-05 DIAGNOSIS — I73.9 PERIPHERAL VASCULAR DISEASE (CMS-HCC): ICD-10-CM

## 2023-12-05 DIAGNOSIS — I45.2 RIGHT BUNDLE BRANCH BLOCK WITH LEFT ANTERIOR FASCICULAR BLOCK: ICD-10-CM

## 2023-12-05 DIAGNOSIS — I10 ESSENTIAL HYPERTENSION: ICD-10-CM

## 2023-12-05 DIAGNOSIS — R60.0 LOCALIZED EDEMA: ICD-10-CM

## 2023-12-05 DIAGNOSIS — M81.0 OSTEOPOROSIS, UNSPECIFIED OSTEOPOROSIS TYPE, UNSPECIFIED PATHOLOGICAL FRACTURE PRESENCE: ICD-10-CM

## 2023-12-05 DIAGNOSIS — Z71.89 ADVANCE DIRECTIVE DISCUSSED WITH PATIENT: ICD-10-CM

## 2023-12-05 DIAGNOSIS — I48.21 PERMANENT ATRIAL FIBRILLATION (MULTI): ICD-10-CM

## 2023-12-05 PROBLEM — J02.9 PHARYNGITIS: Status: RESOLVED | Noted: 2023-06-02 | Resolved: 2023-12-05

## 2023-12-05 PROBLEM — B34.9 VIRAL ILLNESS: Status: RESOLVED | Noted: 2023-06-02 | Resolved: 2023-12-05

## 2023-12-05 PROBLEM — M25.811 SHOULDER IMPINGEMENT, RIGHT: Status: RESOLVED | Noted: 2023-06-02 | Resolved: 2023-12-05

## 2023-12-05 PROBLEM — R51.9 PERSISTENT HEADACHES: Status: RESOLVED | Noted: 2023-06-02 | Resolved: 2023-12-05

## 2023-12-05 PROBLEM — N28.9 ABNORMAL KIDNEY FUNCTION: Status: RESOLVED | Noted: 2023-06-02 | Resolved: 2023-12-05

## 2023-12-05 PROBLEM — U07.1 DISEASE DUE TO SEVERE ACUTE RESPIRATORY SYNDROME CORONAVIRUS 2 (SARS-COV-2): Status: RESOLVED | Noted: 2023-06-02 | Resolved: 2023-12-05

## 2023-12-05 PROBLEM — I80.9 PHLEBITIS: Status: RESOLVED | Noted: 2023-06-02 | Resolved: 2023-12-05

## 2023-12-05 PROBLEM — R79.89 TSH ELEVATION: Status: RESOLVED | Noted: 2023-06-02 | Resolved: 2023-12-05

## 2023-12-05 PROBLEM — R53.1 WEAKNESS: Status: RESOLVED | Noted: 2023-06-02 | Resolved: 2023-12-05

## 2023-12-05 PROBLEM — M25.552 LEFT HIP PAIN: Status: RESOLVED | Noted: 2023-06-02 | Resolved: 2023-12-05

## 2023-12-05 PROBLEM — R09.02 HYPOXIA: Status: RESOLVED | Noted: 2023-06-02 | Resolved: 2023-12-05

## 2023-12-05 PROCEDURE — G0439 PPPS, SUBSEQ VISIT: HCPCS | Performed by: NURSE PRACTITIONER

## 2023-12-05 PROCEDURE — G0444 DEPRESSION SCREEN ANNUAL: HCPCS | Performed by: NURSE PRACTITIONER

## 2023-12-05 PROCEDURE — 3074F SYST BP LT 130 MM HG: CPT | Performed by: NURSE PRACTITIONER

## 2023-12-05 PROCEDURE — 1160F RVW MEDS BY RX/DR IN RCRD: CPT | Performed by: NURSE PRACTITIONER

## 2023-12-05 PROCEDURE — 1159F MED LIST DOCD IN RCRD: CPT | Performed by: NURSE PRACTITIONER

## 2023-12-05 PROCEDURE — 99397 PER PM REEVAL EST PAT 65+ YR: CPT | Performed by: NURSE PRACTITIONER

## 2023-12-05 PROCEDURE — 36415 COLL VENOUS BLD VENIPUNCTURE: CPT

## 2023-12-05 PROCEDURE — 3078F DIAST BP <80 MM HG: CPT | Performed by: NURSE PRACTITIONER

## 2023-12-05 PROCEDURE — 99497 ADVNCD CARE PLAN 30 MIN: CPT | Performed by: NURSE PRACTITIONER

## 2023-12-05 PROCEDURE — 1036F TOBACCO NON-USER: CPT | Performed by: NURSE PRACTITIONER

## 2023-12-05 PROCEDURE — 1170F FXNL STATUS ASSESSED: CPT | Performed by: NURSE PRACTITIONER

## 2023-12-05 PROCEDURE — 1158F ADVNC CARE PLAN TLK DOCD: CPT | Performed by: NURSE PRACTITIONER

## 2023-12-05 RX ORDER — ATORVASTATIN CALCIUM 10 MG/1
10 TABLET, FILM COATED ORAL DAILY
Qty: 90 TABLET | Refills: 3 | Status: SHIPPED | OUTPATIENT
Start: 2023-12-05

## 2023-12-05 RX ORDER — FOLIC ACID 1 MG/1
1 TABLET ORAL DAILY
Qty: 90 TABLET | Refills: 3 | Status: SHIPPED | OUTPATIENT
Start: 2023-12-05

## 2023-12-05 RX ORDER — AMLODIPINE BESYLATE 5 MG/1
5 TABLET ORAL DAILY
Qty: 90 TABLET | Refills: 3 | Status: SHIPPED | OUTPATIENT
Start: 2023-12-05

## 2023-12-05 RX ORDER — LEVOTHYROXINE SODIUM 50 UG/1
50 TABLET ORAL DAILY
Qty: 90 TABLET | Refills: 3 | Status: SHIPPED | OUTPATIENT
Start: 2023-12-05

## 2023-12-05 ASSESSMENT — ACTIVITIES OF DAILY LIVING (ADL)
TAKING_MEDICATION: INDEPENDENT
BATHING: INDEPENDENT
DOING_HOUSEWORK: INDEPENDENT
DRESSING: INDEPENDENT
GROCERY_SHOPPING: INDEPENDENT
MANAGING_FINANCES: INDEPENDENT

## 2023-12-05 ASSESSMENT — ENCOUNTER SYMPTOMS
EYE PAIN: 0
APPETITE CHANGE: 0
FREQUENCY: 0
LOSS OF SENSATION IN FEET: 0
NERVOUS/ANXIOUS: 0
OCCASIONAL FEELINGS OF UNSTEADINESS: 0
SHORTNESS OF BREATH: 0
EYE ITCHING: 0
NUMBNESS: 0
VOMITING: 0
CONSTIPATION: 0
DEPRESSION: 0
ABDOMINAL DISTENTION: 0
WHEEZING: 0
PALPITATIONS: 0
WOUND: 0
DIARRHEA: 0
DYSURIA: 0
HEMATURIA: 0
ARTHRALGIAS: 0
COUGH: 0
ACTIVITY CHANGE: 0

## 2023-12-05 ASSESSMENT — PATIENT HEALTH QUESTIONNAIRE - PHQ9
2. FEELING DOWN, DEPRESSED OR HOPELESS: NOT AT ALL
10. IF YOU CHECKED OFF ANY PROBLEMS, HOW DIFFICULT HAVE THESE PROBLEMS MADE IT FOR YOU TO DO YOUR WORK, TAKE CARE OF THINGS AT HOME, OR GET ALONG WITH OTHER PEOPLE: NOT DIFFICULT AT ALL
1. LITTLE INTEREST OR PLEASURE IN DOING THINGS: NOT AT ALL
SUM OF ALL RESPONSES TO PHQ9 QUESTIONS 1 AND 2: 0

## 2023-12-05 NOTE — ASSESSMENT & PLAN NOTE
Healthcare Maintenance:  - Tdap: 2014 due 2024  - Shingles: Recommended -- she plans to get this after the holidays.   - Flu: complete   - PCV 20: 2015  - Dexa scan: ordered       Social History:  - Occupation: Retired   - Alcohol: none   - Caffeine: 1 cup daily   - Nicotine: none, never   - Recreational drugs: none   - Exercise: yes, workout gym - water aerobics 3 times per week   - Diet: most meals in the home - home cooked meals     Specialist  - Nephrologist- Dr. Fonseca   - EP: Dr. Nagel   - Cardiologist: Dr. Quiroz  - Opthomologist: Dr. Camarena   - Dentist

## 2023-12-05 NOTE — ASSESSMENT & PLAN NOTE
Pacemaker in place  Follows with EP Dr. Robe Quiroz cardiology   [facility]

## 2023-12-05 NOTE — PATIENT INSTRUCTIONS
Continue medications as prescribed  Schedule Dexa scan   Referral  to hematology/oncology given today to work up your blood work for your low platelet count and WBC count   Follow up for annual physical exam

## 2023-12-05 NOTE — PROGRESS NOTES
Subjective   Chief Complaint: Medicare Annual Wellness Visit Subsequent.    HPI   Alexia Amaya is a 88 y.o. female who presents for Medicare Annual Wellness Visit Subsequent.      Patient reports pain and swelling of her 3rd finger on her right hand. She reports that is the finger she uses for everything. The pain is worse in the evening after day of using it and stiffness is felt in the morning. There is no warmth or redness. She has tried heat with some relief but has not tried ice. Discussed that this is likely arthritis -- patient is not interested in doing xray at this time.    Discussed BW with patient. WBC and platelets are low -- will repeat and referral placed to Heme/onc for further workup. Patient agreeable with plan.     Patient denies fever, chills, nausea, vomiting, diarrhea, chest pain, heart palpations, or shortness of breath.       Healthcare Maintenance:  - Tdap: 2014 due 2024  - Shingles: Recommended -- she plans to get this after the holidays.   - Flu: complete   - PCV 20: 2015  - Dexa scan: ordered       Social History:  - Occupation: Retired   - Alcohol: none   - Caffeine: 1 cup daily   - Nicotine: none, never   - Recreational drugs: none   - Exercise: yes, workout gym - water aerobics 3 times per week   - Diet: most meals in the home - home cooked meals     Specialist  - Nephrologist- Dr. Fonseca   - EP: Dr. Nagel   - Cardiologist: Dr. Quiroz  - Opthomologist: Dr. Camarena   - Dentist       The ASCVD Risk score (Dione DK, et al., 2019) failed to calculate for the following reasons:    The 2019 ASCVD risk score is only valid for ages 40 to 79      Review of Systems   Constitutional:  Negative for activity change and appetite change.   HENT:  Negative for congestion.    Eyes:  Negative for pain and itching.   Respiratory:  Negative for cough, shortness of breath and wheezing.    Cardiovascular:  Negative for chest pain, palpitations and leg swelling.   Gastrointestinal:  Negative for abdominal  "distention, constipation, diarrhea and vomiting.   Genitourinary:  Negative for dysuria, frequency, hematuria and urgency.   Musculoskeletal:  Negative for arthralgias and gait problem.        Right index finger swelling and pain    Skin:  Negative for rash and wound.   Neurological:  Negative for numbness.   Psychiatric/Behavioral:  The patient is not nervous/anxious.        Objective   /79 (BP Location: Left arm, Patient Position: Sitting, BP Cuff Size: Adult)   Pulse 70   Temp 36.4 °C (97.6 °F)   Resp 16   Ht 1.651 m (5' 5\")   Wt 66.7 kg (147 lb)   SpO2 94%   BMI 24.46 kg/m²   BSA Body surface area is 1.75 meters squared.      Physical Exam  Constitutional:       Appearance: Normal appearance.   HENT:      Head: Normocephalic.      Right Ear: Tympanic membrane and external ear normal.      Left Ear: Tympanic membrane and external ear normal.      Nose: Nose normal.      Mouth/Throat:      Mouth: Mucous membranes are moist.      Pharynx: Oropharynx is clear.   Eyes:      Extraocular Movements: Extraocular movements intact.      Conjunctiva/sclera: Conjunctivae normal.      Pupils: Pupils are equal, round, and reactive to light.   Cardiovascular:      Rate and Rhythm: Normal rate. Rhythm irregular.      Pulses: Normal pulses.      Heart sounds: Normal heart sounds.   Pulmonary:      Effort: Pulmonary effort is normal.      Breath sounds: Normal breath sounds.   Abdominal:      General: Bowel sounds are normal.      Palpations: Abdomen is soft.   Musculoskeletal:      Cervical back: Normal range of motion.      Right lower leg: No edema.      Left lower leg: No edema.      Comments: Right middle finger swelling    Skin:     General: Skin is warm and dry.   Neurological:      General: No focal deficit present.      Mental Status: She is alert and oriented to person, place, and time.   Psychiatric:         Mood and Affect: Mood is not anxious or depressed.       Lab on 11/29/2023   Component Date Value " Ref Range Status    WBC 11/29/2023 3.9 (L)  4.4 - 11.3 x10*3/uL Final    nRBC 11/29/2023 0.0  0.0 - 0.0 /100 WBCs Final    RBC 11/29/2023 4.09  4.00 - 5.20 x10*6/uL Final    Hemoglobin 11/29/2023 13.6  12.0 - 16.0 g/dL Final    Hematocrit 11/29/2023 43.9  36.0 - 46.0 % Final    MCV 11/29/2023 107 (H)  80 - 100 fL Final    MCH 11/29/2023 33.3  26.0 - 34.0 pg Final    MCHC 11/29/2023 31.0 (L)  32.0 - 36.0 g/dL Final    RDW 11/29/2023 13.3  11.5 - 14.5 % Final    Platelets 11/29/2023 146 (L)  150 - 450 x10*3/uL Final    Neutrophils % 11/29/2023 50.4  40.0 - 80.0 % Final    Immature Granulocytes %, Automated 11/29/2023 0.3  0.0 - 0.9 % Final    Immature Granulocyte Count (IG) includes promyelocytes, myelocytes and metamyelocytes but does not include bands. Percent differential counts (%) should be interpreted in the context of the absolute cell counts (cells/UL).    Lymphocytes % 11/29/2023 32.9  13.0 - 44.0 % Final    Monocytes % 11/29/2023 9.5  2.0 - 10.0 % Final    Eosinophils % 11/29/2023 5.9  0.0 - 6.0 % Final    Basophils % 11/29/2023 1.0  0.0 - 2.0 % Final    Neutrophils Absolute 11/29/2023 1.96  1.60 - 5.50 x10*3/uL Final    Percent differential counts (%) should be interpreted in the context of the absolute cell counts (cells/uL).    Immature Granulocytes Absolute, Au* 11/29/2023 0.01  0.00 - 0.50 x10*3/uL Final    Lymphocytes Absolute 11/29/2023 1.28  0.80 - 3.00 x10*3/uL Final    Monocytes Absolute 11/29/2023 0.37  0.05 - 0.80 x10*3/uL Final    Eosinophils Absolute 11/29/2023 0.23  0.00 - 0.40 x10*3/uL Final    Basophils Absolute 11/29/2023 0.04  0.00 - 0.10 x10*3/uL Final    Glucose 11/29/2023 92  74 - 99 mg/dL Final    Sodium 11/29/2023 140  136 - 145 mmol/L Final    Potassium 11/29/2023 4.4  3.5 - 5.3 mmol/L Final    MILD HEMOLYSIS DETECTED. The result may be falsely elevated due to hemolysis or other interferents. Clinical correlation is recommended. Repeat testing may be considered.    Chloride  11/29/2023 104  98 - 107 mmol/L Final    Bicarbonate 11/29/2023 19 (L)  21 - 32 mmol/L Final    Anion Gap 11/29/2023 21 (H)  10 - 20 mmol/L Final    Urea Nitrogen 11/29/2023 20  6 - 23 mg/dL Final    Creatinine 11/29/2023 1.32 (H)  0.50 - 1.05 mg/dL Final    eGFR 11/29/2023 39 (L)  >60 mL/min/1.73m*2 Final    Calculations of estimated GFR are performed using the 2021 CKD-EPI Study Refit equation without the race variable for the IDMS-Traceable creatinine methods.  https://jasn.asnjournals.org/content/early/2021/09/22/ASN.7121420476    Calcium 11/29/2023 9.5  8.6 - 10.6 mg/dL Final    Albumin 11/29/2023 4.2  3.4 - 5.0 g/dL Final    Alkaline Phosphatase 11/29/2023 62  33 - 136 U/L Final    Total Protein 11/29/2023 6.7  6.4 - 8.2 g/dL Final    AST 11/29/2023 30  9 - 39 U/L Final    MILD HEMOLYSIS DETECTED. The result may be falsely elevated due to hemolysis or other interferents. Clinical correlation is recommended. Repeat testing may be considered.    Bilirubin, Total 11/29/2023 0.9  0.0 - 1.2 mg/dL Final    ALT 11/29/2023 15  7 - 45 U/L Final    Patients treated with Sulfasalazine may generate falsely decreased results for ALT.    Cholesterol 11/29/2023 180  0 - 199 mg/dL Final          Age      Desirable   Borderline High   High     0-19 Y     0 - 169       170 - 199     >/= 200    20-24 Y     0 - 189       190 - 224     >/= 225         >24 Y     0 - 199       200 - 239     >/= 240   **All ranges are based on fasting samples. Specific   therapeutic targets will vary based on patient-specific   cardiac risk.    Pediatric guidelines reference:Pediatrics 2011, 128(S5).Adult guidelines reference: NCEP ATPIII Guidelines,JESIKA 2001, 258:2486-97    Venipuncture immediately after or during the administration of Metamizole may lead to falsely low results. Testing should be performed immediately prior to Metamizole dosing.    HDL-Cholesterol 11/29/2023 86.0  mg/dL Final      Age       Very Low   Low     Normal    High    0-19  Y    < 35      < 40     40-45     ----  20-24 Y    ----     < 40      >45      ----        >24 Y      ----     < 40     40-60      >60      Cholesterol/HDL Ratio 11/29/2023 2.1   Final      Ref Values  Desirable  < 3.4  High Risk  > 5.0    LDL Calculated 11/29/2023 80  <=99 mg/dL Final                                Near   Borderline      AGE      Desirable  Optimal    High     High     Very High     0-19 Y     0 - 109     ---    110-129   >/= 130     ----    20-24 Y     0 - 119     ---    120-159   >/= 160     ----      >24 Y     0 -  99   100-129  130-159   160-189     >/=190      VLDL 11/29/2023 14  0 - 40 mg/dL Final    Triglycerides 11/29/2023 70  0 - 149 mg/dL Final       Age         Desirable   Borderline High   High     Very High   0 D-90 D    19 - 174         ----         ----        ----  91 D- 9 Y     0 -  74        75 -  99     >/= 100      ----    10-19 Y     0 -  89        90 - 129     >/= 130      ----    20-24 Y     0 - 114       115 - 149     >/= 150      ----         >24 Y     0 - 149       150 - 199    200- 499    >/= 500    Venipuncture immediately after or during the administration of Metamizole may lead to falsely low results. Testing should be performed immediately prior to Metamizole dosing.    Non HDL Cholesterol 11/29/2023 94  0 - 149 mg/dL Final          Age       Desirable   Borderline High   High     Very High     0-19 Y     0 - 119       120 - 144     >/= 145    >/= 160    20-24 Y     0 - 149       150 - 189     >/= 190      ----         >24 Y    30 mg/dL above LDL Cholesterol goal      Thyroid Stimulating Hormone 11/29/2023 3.39  0.44 - 3.98 mIU/L Final   Orders Only on 09/07/2023   Component Date Value Ref Range Status    Vitamin D, 25-Hydroxy 09/07/2023 44  ng/mL Final    Comment: .  DEFICIENCY:         < 20   NG/ML  INSUFFICIENCY:      20-29  NG/ML  SUFFICIENCY:         NG/ML    THIS ASSAY ACCURATELY QUANTIFIES THE SUM OF  VITAMIN D3, 25-HYDROXY AND VIT D2,25-HYDROXY.      PTH  09/07/2023 53.1  18.5 - 88.0 pg/mL Final    Magnesium 09/07/2023 2.10  1.60 - 2.40 mg/dL Final    ALBUMIN (MG/L) IN URINE 09/07/2023 10.2  Not Established mg/L Final    Albumin/Creatine Ratio 09/07/2023 31.1 (H)  0.0 - 30.0 ug/mg crt Final    Creatinine, Urine 09/07/2023 32.8  20.0 - 320.0 mg/dL Final    Glucose 09/07/2023 107 (H)  74 - 99 mg/dL Final    Sodium 09/07/2023 140  136 - 145 mmol/L Final    Potassium 09/07/2023 4.4  3.5 - 5.3 mmol/L Final    Chloride 09/07/2023 103  98 - 107 mmol/L Final    Bicarbonate 09/07/2023 30  21 - 32 mmol/L Final    Anion Gap 09/07/2023 11  10 - 20 mmol/L Final    Urea Nitrogen 09/07/2023 27 (H)  6 - 23 mg/dL Final    Creatinine 09/07/2023 1.30 (H)  0.50 - 1.05 mg/dL Final    GFR Female 09/07/2023 40 (A)  >90 mL/min/1.73m2 Final    Comment:  CALCULATIONS OF ESTIMATED GFR ARE PERFORMED   USING THE 2021 CKD-EPI STUDY REFIT EQUATION   WITHOUT THE RACE VARIABLE FOR THE IDMS-TRACEABLE   CREATININE METHODS.    https://jasn.asnjournals.org/content/early/2021/09/22/ASN.3686112676      Calcium 09/07/2023 9.9  8.6 - 10.6 mg/dL Final    Phosphorus 09/07/2023 4.1  2.5 - 4.9 mg/dL Final    Comment:  The performance characteristics of phosphorus testing in   heparinized plasma have been validated by the individual     laboratory site where testing is performed. Testing    on heparinized plasma is not approved by the FDA;    however, such approval is not necessary.      Albumin 09/07/2023 4.4  3.4 - 5.0 g/dL Final    WBC 09/07/2023 5.8  4.4 - 11.3 x10E9/L Final    nRBC 09/07/2023 0.0  0.0 - 0.0 /100 WBC Final    RBC 09/07/2023 4.23  4.00 - 5.20 x10E12/L Final    Hemoglobin 09/07/2023 13.8  12.0 - 16.0 g/dL Final    Hematocrit 09/07/2023 42.2  36.0 - 46.0 % Final    MCV 09/07/2023 100  80 - 100 fL Final    MCHC 09/07/2023 32.7  32.0 - 36.0 g/dL Final    Platelets 09/07/2023 173  150 - 450 x10E9/L Final    RDW 09/07/2023 13.7  11.5 - 14.5 % Final   Orders Only on 02/28/2023   Component Date  Value Ref Range Status    Protein, Ur 02/28/2023 20  5 - 24 mg/dL Final    Creatinine, Urine 02/28/2023 148.0  20.0 - 320.0 mg/dL Final    Prot/Creat, Ur 02/28/2023 0.14  0.00 - 0.17 mg/mg Creat Final   Orders Only on 02/28/2023   Component Date Value Ref Range Status    Glucose 02/28/2023 95  74 - 99 mg/dL Final    Sodium 02/28/2023 143  136 - 145 mmol/L Final    Potassium 02/28/2023 4.5  3.5 - 5.3 mmol/L Final    Chloride 02/28/2023 104  98 - 107 mmol/L Final    Bicarbonate 02/28/2023 34 (H)  21 - 32 mmol/L Final    Anion Gap 02/28/2023 10  10 - 20 mmol/L Final    Urea Nitrogen 02/28/2023 25 (H)  6 - 23 mg/dL Final    Creatinine 02/28/2023 1.29 (H)  0.50 - 1.05 mg/dL Final    GFR Female 02/28/2023 40 (A)  >90 mL/min/1.73m2 Final    Comment:  CALCULATIONS OF ESTIMATED GFR ARE PERFORMED   USING THE 2021 CKD-EPI STUDY REFIT EQUATION   WITHOUT THE RACE VARIABLE FOR THE IDMS-TRACEABLE   CREATININE METHODS.    https://jasn.asnjournals.org/content/early/2021/09/22/ASN.2917699957      Calcium 02/28/2023 9.7  8.6 - 10.6 mg/dL Final    Phosphorus 02/28/2023 3.6  2.5 - 4.9 mg/dL Final    Comment:  The performance characteristics of phosphorus testing in   heparinized plasma have been validated by the individual     laboratory site where testing is performed. Testing    on heparinized plasma is not approved by the FDA;    however, such approval is not necessary.      Albumin 02/28/2023 4.2  3.4 - 5.0 g/dL Final   Orders Only on 02/28/2023   Component Date Value Ref Range Status    WBC 02/28/2023 5.1  4.4 - 11.3 x10E9/L Final    nRBC 02/28/2023 0.0  0.0 - 0.0 /100 WBC Final    RBC 02/28/2023 4.35  4.00 - 5.20 x10E12/L Final    Hemoglobin 02/28/2023 14.2  12.0 - 16.0 g/dL Final    Hematocrit 02/28/2023 42.7  36.0 - 46.0 % Final    MCV 02/28/2023 98  80 - 100 fL Final    MCHC 02/28/2023 33.3  32.0 - 36.0 g/dL Final    Platelets 02/28/2023 162  150 - 450 x10E9/L Final    RDW 02/28/2023 13.2  11.5 - 14.5 % Final   Orders Only on  02/28/2023   Component Date Value Ref Range Status    Magnesium 02/28/2023 2.03  1.60 - 2.40 mg/dL Final   Orders Only on 02/28/2023   Component Date Value Ref Range Status    PTH 02/28/2023 76.0  18.5 - 88.0 pg/mL Final   Orders Only on 02/28/2023   Component Date Value Ref Range Status    Vitamin D, 25-Hydroxy 02/28/2023 35  ng/mL Final    Comment: .  DEFICIENCY:         < 20   NG/ML  INSUFFICIENCY:      20-29  NG/ML  SUFFICIENCY:         NG/ML    THIS ASSAY ACCURATELY QUANTIFIES THE SUM OF  VITAMIN D3, 25-HYDROXY AND VIT D2,25-HYDROXY.       Current Outpatient Medications on File Prior to Visit   Medication Sig Dispense Refill    amLODIPine (Norvasc) 5 mg tablet TAKE ONE TABLET EVERY DAY 90 tablet 0    ammonium lactate (Amlactin) 12 % cream Apply 1 Application topically.      atorvastatin (Lipitor) 10 mg tablet TAKE ONE TABLET EVERY DAY 90 tablet 0    betamethasone dipropionate (Diprolene) 0.05 % ointment APPLY SPARINGLY EXTERNALLY TO THE AFFECTED AREA TWICE DAILY      Ca-D3-mag-zinc--oylanda-boron (Caltrate 600-D Plus Minerals) 600 mg calcium- 800 unit-40 mg tablet,chewable Chew 1 tablet once daily.      calcium carbonate-vitamin D3 600 mg-20 mcg (800 unit) tablet Take 1 tablet by mouth once daily.      folic acid (Folvite) 1 mg tablet TAKE ONE TABLET EVERY DAY 90 tablet 0    furosemide (Lasix) 40 mg tablet Take 1 tablet (40 mg) by mouth once daily.      levothyroxine (Synthroid, Levoxyl) 50 mcg tablet TAKE ONE TABLET EVERY DAY 90 tablet 1    multivitamin (Multiple Vitamins) tablet Take 1 tablet by mouth once daily.      rivaroxaban (Xarelto) 20 mg tablet Take 1 tablet (20 mg) by mouth once daily.      vit A,C and E-lutein-minerals (Ocuvite) 300 mcg-200 mg-27 mg-2 mg tablet Take 1 tablet by mouth once daily.      vit C,O-Ah-jjuwu-lutein-zeaxan 250-90-40-1 mg capsule Take 1 capsule by mouth once daily.      hydrOXYzine HCL (Atarax) 25 mg tablet Take 1 tablet (25 mg) by mouth once daily at bedtime. (Patient  not taking: Reported on 12/5/2023) 7 tablet 0    ondansetron (Zofran) 4 mg tablet Take by mouth.      [DISCONTINUED] levothyroxine (Synthroid, Levoxyl) 50 mcg tablet Take 1 tablet (50 mcg) by mouth once daily. 90 tablet 1     No current facility-administered medications on file prior to visit.     No images are attached to the encounter.            Assessment/Plan   Problem List Items Addressed This Visit             ICD-10-CM    Thrombocytopenia (CMS/[facility]) D69.6     Referral placed to Heme/Onc Dr. 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 Nagel         Presence of cardiac pacemaker Z95.0     Pacemaker in place  Follows with EP Dr. Nagel         Permanent atrial fibrillation (CMS/[facility]) I48.21     Pacemaker in place  Follows with EP Dr. Robe Quiroz cardiology   On xarelto          Relevant Medications    amLODIPine (Norvasc) 5 mg tablet    Peripheral vascular disease (CMS/[facility]) I73.9     Stable         Osteoporosis M81.0    Relevant Orders    XR DEXA bone density    Medicare annual wellness visit, subsequent - Primary Z00.00     Healthcare Maintenance:  - Tdap: 2014 due 2024  - Shingles: Recommended -- she plans to get this after the holidays.   - Flu: complete   - PCV 20: 2015  - Dexa scan: ordered       Social History:  - Occupation: Retired   - Alcohol: none   - Caffeine: 1 cup daily   - Nicotine: none, never   - Recreational drugs: none   - Exercise: yes, workout gym - water aerobics 3 times per week   - Diet: most meals in the home - home cooked meals     Specialist  - Nephrologist- Dr. Fonseca   - EP: Dr. Nagel   - Cardiologist: Dr. Quiroz  - Opthomologist: Dr. Camarena   - Dentist           Localized edema R60.0    Hypothyroidism E03.9     Stable  Continue synthroid          Relevant Medications    levothyroxine (Synthroid, Levoxyl) 50 mcg tablet    Hyperlipidemia E78.5     Stable   Continue lipitor          Relevant Medications    atorvastatin (Lipitor) 10 mg tablet    Essential hypertension I10     Stable  Continue amlodipine          Relevant Medications    amLODIPine (Norvasc) 5 mg tablet     Other Visit Diagnoses         Codes    Health maintenance examination     Z00.00    Relevant Medications    folic acid (Folvite) 1 mg tablet

## 2023-12-05 NOTE — ACP (ADVANCE CARE PLANNING)
Confirming Previous Code Status:   Advance Care Planning Note     Discussion Date: 12/05/23   Discussion Participants: patient    The patient wishes to discuss Advance Care Planning today and the following is a brief summary of our discussion.     Patient has capacity to make their own medical decisions: Yes  Health Care Agent/Surrogate Decision Maker documented in chart: Yes    Documents on file and valid:  Advance Directive/Living Will: Yes   Health Care Power of : Yes      Time Statement: Total face to face time spent on advance care planning was 20 minutes with 10 minutes spent in counseling, including the explanation.    SRI Alejo-CNP  12/5/2023 9:49 AM

## 2024-01-23 RX ORDER — VIT A/VIT C/VIT E/ZINC/COPPER 2148-113
TABLET ORAL EVERY 24 HOURS
COMMUNITY
End: 2024-01-26 | Stop reason: WASHOUT

## 2024-01-26 ENCOUNTER — LAB (OUTPATIENT)
Dept: LAB | Facility: CLINIC | Age: 89
End: 2024-01-26
Payer: MEDICARE

## 2024-01-26 ENCOUNTER — OFFICE VISIT (OUTPATIENT)
Dept: HEMATOLOGY/ONCOLOGY | Facility: CLINIC | Age: 89
End: 2024-01-26
Payer: MEDICARE

## 2024-01-26 ENCOUNTER — DOCUMENTATION (OUTPATIENT)
Dept: HEMATOLOGY/ONCOLOGY | Facility: CLINIC | Age: 89
End: 2024-01-26

## 2024-01-26 VITALS
RESPIRATION RATE: 16 BRPM | BODY MASS INDEX: 25.27 KG/M2 | WEIGHT: 148.04 LBS | TEMPERATURE: 98.2 F | SYSTOLIC BLOOD PRESSURE: 139 MMHG | HEIGHT: 64 IN | DIASTOLIC BLOOD PRESSURE: 71 MMHG | OXYGEN SATURATION: 98 % | HEART RATE: 80 BPM

## 2024-01-26 DIAGNOSIS — D69.6 THROMBOCYTOPENIA (CMS-HCC): ICD-10-CM

## 2024-01-26 LAB
BASOPHILS # BLD AUTO: 0.03 X10*3/UL (ref 0–0.1)
BASOPHILS NFR BLD AUTO: 0.5 %
EOSINOPHIL # BLD AUTO: 0.13 X10*3/UL (ref 0–0.4)
EOSINOPHIL NFR BLD AUTO: 2.2 %
ERYTHROCYTE [DISTWIDTH] IN BLOOD BY AUTOMATED COUNT: 13.1 % (ref 11.5–14.5)
HCT VFR BLD AUTO: 42.3 % (ref 36–46)
HGB BLD-MCNC: 14.3 G/DL (ref 12–16)
IMM GRANULOCYTES # BLD AUTO: 0.01 X10*3/UL (ref 0–0.5)
IMM GRANULOCYTES NFR BLD AUTO: 0.2 % (ref 0–0.9)
LYMPHOCYTES # BLD AUTO: 1.79 X10*3/UL (ref 0.8–3)
LYMPHOCYTES NFR BLD AUTO: 29.9 %
MCH RBC QN AUTO: 33 PG (ref 26–34)
MCHC RBC AUTO-ENTMCNC: 33.8 G/DL (ref 32–36)
MCV RBC AUTO: 98 FL (ref 80–100)
MONOCYTES # BLD AUTO: 0.49 X10*3/UL (ref 0.05–0.8)
MONOCYTES NFR BLD AUTO: 8.2 %
NEUTROPHILS # BLD AUTO: 3.54 X10*3/UL (ref 1.6–5.5)
NEUTROPHILS NFR BLD AUTO: 59 %
NRBC BLD-RTO: ABNORMAL /100{WBCS}
PLATELET # BLD AUTO: 144 X10*3/UL (ref 150–450)
RBC # BLD AUTO: 4.33 X10*6/UL (ref 4–5.2)
WBC # BLD AUTO: 6 X10*3/UL (ref 4.4–11.3)

## 2024-01-26 PROCEDURE — 82607 VITAMIN B-12: CPT | Performed by: INTERNAL MEDICINE

## 2024-01-26 PROCEDURE — 83521 IG LIGHT CHAINS FREE EACH: CPT | Performed by: INTERNAL MEDICINE

## 2024-01-26 PROCEDURE — 99214 OFFICE O/P EST MOD 30 MIN: CPT | Performed by: INTERNAL MEDICINE

## 2024-01-26 PROCEDURE — 3078F DIAST BP <80 MM HG: CPT | Performed by: INTERNAL MEDICINE

## 2024-01-26 PROCEDURE — 84443 ASSAY THYROID STIM HORMONE: CPT | Performed by: INTERNAL MEDICINE

## 2024-01-26 PROCEDURE — 3075F SYST BP GE 130 - 139MM HG: CPT | Performed by: INTERNAL MEDICINE

## 2024-01-26 PROCEDURE — 84155 ASSAY OF PROTEIN SERUM: CPT | Mod: 59 | Performed by: INTERNAL MEDICINE

## 2024-01-26 PROCEDURE — 1157F ADVNC CARE PLAN IN RCRD: CPT | Performed by: INTERNAL MEDICINE

## 2024-01-26 PROCEDURE — 85025 COMPLETE CBC W/AUTO DIFF WBC: CPT | Performed by: INTERNAL MEDICINE

## 2024-01-26 PROCEDURE — 82746 ASSAY OF FOLIC ACID SERUM: CPT | Performed by: INTERNAL MEDICINE

## 2024-01-26 PROCEDURE — 82306 VITAMIN D 25 HYDROXY: CPT | Performed by: INTERNAL MEDICINE

## 2024-01-26 PROCEDURE — 86320 SERUM IMMUNOELECTROPHORESIS: CPT | Performed by: INTERNAL MEDICINE

## 2024-01-26 PROCEDURE — 82728 ASSAY OF FERRITIN: CPT | Performed by: INTERNAL MEDICINE

## 2024-01-26 PROCEDURE — 99204 OFFICE O/P NEW MOD 45 MIN: CPT | Performed by: INTERNAL MEDICINE

## 2024-01-26 PROCEDURE — 1036F TOBACCO NON-USER: CPT | Performed by: INTERNAL MEDICINE

## 2024-01-26 PROCEDURE — 1126F AMNT PAIN NOTED NONE PRSNT: CPT | Performed by: INTERNAL MEDICINE

## 2024-01-26 PROCEDURE — 83540 ASSAY OF IRON: CPT | Performed by: INTERNAL MEDICINE

## 2024-01-26 PROCEDURE — 36415 COLL VENOUS BLD VENIPUNCTURE: CPT | Performed by: INTERNAL MEDICINE

## 2024-01-26 PROCEDURE — 80053 COMPREHEN METABOLIC PANEL: CPT | Performed by: INTERNAL MEDICINE

## 2024-01-26 PROCEDURE — 86334 IMMUNOFIX E-PHORESIS SERUM: CPT | Performed by: INTERNAL MEDICINE

## 2024-01-26 PROCEDURE — 84165 PROTEIN E-PHORESIS SERUM: CPT | Performed by: INTERNAL MEDICINE

## 2024-01-26 ASSESSMENT — PAIN SCALES - GENERAL: PAINLEVEL: 0-NO PAIN

## 2024-01-26 NOTE — PROGRESS NOTES
Patient seen by Dr. Villavicencio today in clinic. Reinforcement education provided regarding next steps with plan of care.  Pt being referred to Dr. Villavicencio for mild anemia and mild thrombocytopenia.  Labs today. Orientation to office along with office contact information provided.  FUV in 4 weeks.  Patient verbalizes understanding of plan of care via teachback method.

## 2024-01-26 NOTE — PROGRESS NOTES
"Patient ID: Alexia Amaya is a 88 y.o. female.  Referring Physician: Holli Miller, APRN-CNP  [address] [facility], [address]  [place] [address]  Primary Care Provider: Wilton Regan DO  Visit Type: Initial Visit      Subjective    HPI  The patient is an 88-year-old woman referred to me for further evaluation and management of mild anemia and mild thrombocytopenia.  She has past medical history of hypertension, atrial fibrillation, hypercholesterolemia, hypothyroidism, peripheral vascular disease chronic kidney disease routine CBC revealed WBC 3.9 platelets 145,000/mm³ hemoglobin 13.2 g/dL.  Patient was referred for further evaluation and management    At interview on January 26, 2024 the patient denied a history of fevers, recurrent localized systemic infections, recurrent localized systemic bleeding, night sweats, weight loss, nausea, vomiting, hematemesis, melena, hematochezia, hematuria.    Review of Systems   All other systems reviewed and are negative.       Objective   BSA: 1.74 meters squared  /71   Pulse 80   Temp 36.8 °C (98.2 °F)   Resp 16   Ht 1.627 m (5' 4.06\")   Wt 67.1 kg (148 lb 0.6 oz)   SpO2 98%   BMI 25.37 kg/m²      has a past medical history of Personal history of other diseases of the respiratory system (12/26/2020), Personal history of other diseases of the respiratory system (01/29/2019), and Presence of cardiac pacemaker.   has a past surgical history that includes Knee surgery (02/21/2014) and Cataract extraction (04/03/2018).  No family history on file.  Oncology History    No history exists.       Alexia Amaya  reports that she has never smoked. She has never used smokeless tobacco.  She  reports no history of alcohol use.  She  reports no history of drug use.    Physical Exam  Constitutional:       Appearance: Normal appearance.   HENT:      Head: Normocephalic and atraumatic.      Nose: Nose normal.      Mouth/Throat:      Mouth: Mucous " membranes are moist.      Pharynx: Oropharynx is clear.   Eyes:      Extraocular Movements: Extraocular movements intact.      Conjunctiva/sclera: Conjunctivae normal.      Pupils: Pupils are equal, round, and reactive to light.   Cardiovascular:      Rate and Rhythm: Normal rate and regular rhythm.   Pulmonary:      Effort: Pulmonary effort is normal.      Breath sounds: Normal breath sounds.   Abdominal:      General: Abdomen is flat. Bowel sounds are normal.      Palpations: Abdomen is soft.   Musculoskeletal:         General: Normal range of motion.      Cervical back: Normal range of motion and neck supple.   Neurological:      General: No focal deficit present.      Mental Status: She is alert and oriented to person, place, and time. Mental status is at baseline.   Psychiatric:         Mood and Affect: Mood normal.         Behavior: Behavior normal.         Thought Content: Thought content normal.         Judgment: Judgment normal.         WBC   Date/Time Value Ref Range Status   11/29/2023 08:00 AM 3.9 (L) 4.4 - 11.3 x10*3/uL Final   09/07/2023 09:27 AM 5.8 4.4 - 11.3 x10E9/L Final   02/28/2023 08:25 AM 5.1 4.4 - 11.3 x10E9/L Final   10/18/2022 08:01 AM 4.0 (L) 4.4 - 11.3 x10E9/L Final     nRBC   Date Value Ref Range Status   11/29/2023 0.0 0.0 - 0.0 /100 WBCs Final   09/07/2023 0.0 0.0 - 0.0 /100 WBC Final   02/28/2023 0.0 0.0 - 0.0 /100 WBC Final   10/18/2022 0.0 0.0 - 0.0 /100 WBC Final     RBC   Date Value Ref Range Status   11/29/2023 4.09 4.00 - 5.20 x10*6/uL Final   09/07/2023 4.23 4.00 - 5.20 x10E12/L Final   02/28/2023 4.35 4.00 - 5.20 x10E12/L Final   10/18/2022 4.28 4.00 - 5.20 x10E12/L Final     Hemoglobin   Date Value Ref Range Status   11/29/2023 13.6 12.0 - 16.0 g/dL Final   09/07/2023 13.8 12.0 - 16.0 g/dL Final   02/28/2023 14.2 12.0 - 16.0 g/dL Final   10/18/2022 14.1 12.0 - 16.0 g/dL Final     Hematocrit   Date Value Ref Range Status   11/29/2023 43.9 36.0 - 46.0 % Final   09/07/2023 42.2  "36.0 - 46.0 % Final   02/28/2023 42.7 36.0 - 46.0 % Final   10/18/2022 43.7 36.0 - 46.0 % Final     MCV   Date/Time Value Ref Range Status   11/29/2023 08:00  (H) 80 - 100 fL Final   09/07/2023 09:27  80 - 100 fL Final   02/28/2023 08:25 AM 98 80 - 100 fL Final   10/18/2022 08:01  (H) 80 - 100 fL Final     MCH   Date/Time Value Ref Range Status   11/29/2023 08:00 AM 33.3 26.0 - 34.0 pg Final     MCHC   Date/Time Value Ref Range Status   11/29/2023 08:00 AM 31.0 (L) 32.0 - 36.0 g/dL Final   09/07/2023 09:27 AM 32.7 32.0 - 36.0 g/dL Final   02/28/2023 08:25 AM 33.3 32.0 - 36.0 g/dL Final   10/18/2022 08:01 AM 32.3 32.0 - 36.0 g/dL Final     RDW   Date/Time Value Ref Range Status   11/29/2023 08:00 AM 13.3 11.5 - 14.5 % Final   09/07/2023 09:27 AM 13.7 11.5 - 14.5 % Final   02/28/2023 08:25 AM 13.2 11.5 - 14.5 % Final   10/18/2022 08:01 AM 13.4 11.5 - 14.5 % Final     Platelets   Date/Time Value Ref Range Status   11/29/2023 08:00  (L) 150 - 450 x10*3/uL Final   09/07/2023 09:27  150 - 450 x10E9/L Final   02/28/2023 08:25  150 - 450 x10E9/L Final   10/18/2022 08:01  150 - 450 x10E9/L Final     No results found for: \"MPV\"  Neutrophils %   Date/Time Value Ref Range Status   11/29/2023 08:00 AM 50.4 40.0 - 80.0 % Final   10/18/2022 08:01 AM 40.9 40.0 - 80.0 % Final   10/15/2021 07:53 AM 39.9 40.0 - 80.0 % Final   10/06/2020 08:35 AM 45.7 40.0 - 80.0 % Final     Immature Granulocytes %, Automated   Date/Time Value Ref Range Status   11/29/2023 08:00 AM 0.3 0.0 - 0.9 % Final     Comment:     Immature Granulocyte Count (IG) includes promyelocytes, myelocytes and metamyelocytes but does not include bands. Percent differential counts (%) should be interpreted in the context of the absolute cell counts (cells/UL).   10/18/2022 08:01 AM 0.2 0.0 - 0.9 % Final     Comment:      Immature Granulocyte Count (IG) includes promyelocytes,    myelocytes and metamyelocytes but does not include " bands.   Percent differential counts (%) should be interpreted in the   context of the absolute cell counts (cells/L).     10/15/2021 07:53 AM 0.2 0.0 - 0.9 % Final     Comment:      Immature Granulocyte Count (IG) includes promyelocytes,    myelocytes and metamyelocytes but does not include bands.   Percent differential counts (%) should be interpreted in the   context of the absolute cell counts (cells/L).     10/06/2020 08:35 AM 0.2 0.0 - 0.9 % Final     Comment:      Immature Granulocyte Count (IG) includes promyelocytes,    myelocytes and metamyelocytes but does not include bands.   Percent differential counts (%) should be interpreted in the   context of the absolute cell counts (cells/L).       Lymphocytes %   Date/Time Value Ref Range Status   11/29/2023 08:00 AM 32.9 13.0 - 44.0 % Final   10/18/2022 08:01 AM 36.1 13.0 - 44.0 % Final   10/15/2021 07:53 AM 38.3 13.0 - 44.0 % Final   10/06/2020 08:35 AM 35.8 13.0 - 44.0 % Final     Monocytes %   Date/Time Value Ref Range Status   11/29/2023 08:00 AM 9.5 2.0 - 10.0 % Final   10/18/2022 08:01 AM 11.4 2.0 - 10.0 % Final   10/15/2021 07:53 AM 12.9 2.0 - 10.0 % Final   10/06/2020 08:35 AM 11.2 2.0 - 10.0 % Final     Eosinophils %   Date/Time Value Ref Range Status   11/29/2023 08:00 AM 5.9 0.0 - 6.0 % Final   10/18/2022 08:01 AM 10.2 0.0 - 6.0 % Final   10/15/2021 07:53 AM 7.4 0.0 - 6.0 % Final   10/06/2020 08:35 AM 5.9 0.0 - 6.0 % Final     Basophils %   Date/Time Value Ref Range Status   11/29/2023 08:00 AM 1.0 0.0 - 2.0 % Final   10/18/2022 08:01 AM 1.2 0.0 - 2.0 % Final   10/15/2021 07:53 AM 1.3 0.0 - 2.0 % Final   10/06/2020 08:35 AM 1.2 0.0 - 2.0 % Final     Neutrophils Absolute   Date/Time Value Ref Range Status   11/29/2023 08:00 AM 1.96 1.60 - 5.50 x10*3/uL Final     Comment:     Percent differential counts (%) should be interpreted in the context of the absolute cell counts (cells/uL).   10/18/2022 08:01 AM 1.64 1.60 - 5.50 x10E9/L Final   10/15/2021  "07:53 AM 1.82 1.60 - 5.50 x10E9/L Final   10/06/2020 08:35 AM 2.24 1.60 - 5.50 x10E9/L Final     Immature Granulocytes Absolute, Automated   Date/Time Value Ref Range Status   11/29/2023 08:00 AM 0.01 0.00 - 0.50 x10*3/uL Final     Lymphocytes Absolute   Date/Time Value Ref Range Status   11/29/2023 08:00 AM 1.28 0.80 - 3.00 x10*3/uL Final   10/18/2022 08:01 AM 1.45 0.80 - 3.00 x10E9/L Final   10/15/2021 07:53 AM 1.75 0.80 - 3.00 x10E9/L Final   10/06/2020 08:35 AM 1.76 0.80 - 3.00 x10E9/L Final     Monocytes Absolute   Date/Time Value Ref Range Status   11/29/2023 08:00 AM 0.37 0.05 - 0.80 x10*3/uL Final   10/18/2022 08:01 AM 0.46 0.05 - 0.80 x10E9/L Final   10/15/2021 07:53 AM 0.59 0.05 - 0.80 x10E9/L Final   10/06/2020 08:35 AM 0.55 0.05 - 0.80 x10E9/L Final     Eosinophils Absolute   Date/Time Value Ref Range Status   11/29/2023 08:00 AM 0.23 0.00 - 0.40 x10*3/uL Final   10/18/2022 08:01 AM 0.41 (H) 0.00 - 0.40 x10E9/L Final   10/15/2021 07:53 AM 0.34 0.00 - 0.40 x10E9/L Final   10/06/2020 08:35 AM 0.29 0.00 - 0.40 x10E9/L Final     Basophils Absolute   Date/Time Value Ref Range Status   11/29/2023 08:00 AM 0.04 0.00 - 0.10 x10*3/uL Final   10/18/2022 08:01 AM 0.05 0.00 - 0.10 x10E9/L Final   10/15/2021 07:53 AM 0.06 0.00 - 0.10 x10E9/L Final   10/06/2020 08:35 AM 0.06 0.00 - 0.10 x10E9/L Final       No components found for: \"PT\"  No results found for: \"APTT\"    Assessment/Plan    The patient is an 88-year-old woman referred to me for further evaluation and management of mild anemia and mild thrombocytopenia.  She has past medical history of hypertension, atrial fibrillation, hypercholesterolemia, hypothyroidism, peripheral vascular disease chronic kidney disease routine CBC revealed WBC 3.9 platelets 145,000/mm³ hemoglobin 13.2 g/dL.  Patient was referred for further evaluation and management.    Physical examination within normal limits.  Reviewed 2 years of lab data with the patient the patient has mild " leukopenia mild thrombocytopenia without untoward manifestations of recurrent localized systemic infections and recurrent localized or systemic bleeding.  Differential diagnosis is wide such as decreased production, immune destruction, deficiency state, drug-induced, myelodysplastic syndrome versus others.  Would like to evaluate further to rule out any correctable deficiencies.  The patient understood appreciated all the details provided and was grateful return in 4 weeks.     Diagnoses and all orders for this visit:  Thrombocytopenia (CMS/HCC)  -     Referral to Hematology and Oncology  -     CBC and Auto Differential; Future  -     Comprehensive Metabolic Panel; Future  -     Ferritin; Future  -     Folate; Future  -     Iron and TIBC; Future  -     TSH with reflex to Free T4 if abnormal; Future  -     Vitamin B12; Future  -     Vitamin D 25-Hydroxy,Total (for eval of Vitamin D levels); Future  -     [place]/Lambda Free Light Chain, Serum; Future  -     Serum Protein Electrophoresis + Immunofixation; Future  -     Clinic Appointment Request; Future         Albin Villavicencio MD

## 2024-01-27 LAB
25(OH)D3 SERPL-MCNC: 39 NG/ML (ref 30–100)
ALBUMIN SERPL BCP-MCNC: 4.4 G/DL (ref 3.4–5)
ALP SERPL-CCNC: 63 U/L (ref 33–136)
ALT SERPL W P-5'-P-CCNC: 13 U/L (ref 7–45)
ANION GAP SERPL CALC-SCNC: 16 MMOL/L (ref 10–20)
AST SERPL W P-5'-P-CCNC: 25 U/L (ref 9–39)
BILIRUB SERPL-MCNC: 1 MG/DL (ref 0–1.2)
BUN SERPL-MCNC: 31 MG/DL (ref 6–23)
CALCIUM SERPL-MCNC: 10.2 MG/DL (ref 8.6–10.6)
CHLORIDE SERPL-SCNC: 100 MMOL/L (ref 98–107)
CO2 SERPL-SCNC: 28 MMOL/L (ref 21–32)
CREAT SERPL-MCNC: 1.41 MG/DL (ref 0.5–1.05)
EGFRCR SERPLBLD CKD-EPI 2021: 36 ML/MIN/1.73M*2
FERRITIN SERPL-MCNC: 115 NG/ML (ref 8–150)
FOLATE SERPL-MCNC: >24 NG/ML
GLUCOSE SERPL-MCNC: 98 MG/DL (ref 74–99)
IRON SATN MFR SERPL: 28 % (ref 25–45)
IRON SERPL-MCNC: 113 UG/DL (ref 35–150)
POTASSIUM SERPL-SCNC: 4.2 MMOL/L (ref 3.5–5.3)
PROT SERPL-MCNC: 7.3 G/DL (ref 6.4–8.2)
PROT SERPL-MCNC: 7.3 G/DL (ref 6.4–8.2)
SODIUM SERPL-SCNC: 140 MMOL/L (ref 136–145)
TIBC SERPL-MCNC: 401 UG/DL (ref 240–445)
TSH SERPL-ACNC: 3.34 MIU/L (ref 0.44–3.98)
UIBC SERPL-MCNC: 288 UG/DL (ref 110–370)
VIT B12 SERPL-MCNC: 367 PG/ML (ref 211–911)

## 2024-01-28 LAB
KAPPA LC SERPL-MCNC: 3.99 MG/DL (ref 0.33–1.94)
KAPPA LC/LAMBDA SER: 1.45 {RATIO} (ref 0.26–1.65)
LAMBDA LC SERPL-MCNC: 2.76 MG/DL (ref 0.57–2.63)

## 2024-01-30 LAB
ALBUMIN: 4.4 G/DL (ref 3.4–5)
ALPHA 1 GLOBULIN: 0.3 G/DL (ref 0.2–0.6)
ALPHA 2 GLOBULIN: 0.8 G/DL (ref 0.4–1.1)
BETA GLOBULIN: 0.8 G/DL (ref 0.5–1.2)
GAMMA GLOBULIN: 1 G/DL (ref 0.5–1.4)
IMMUNOFIXATION COMMENT: NORMAL
PATH REVIEW - SERUM IMMUNOFIXATION: NORMAL
PATH REVIEW-SERUM PROTEIN ELECTROPHORESIS: NORMAL
PROTEIN ELECTROPHORESIS COMMENT: NORMAL

## 2024-02-21 ENCOUNTER — TELEPHONE (OUTPATIENT)
Dept: PRIMARY CARE | Facility: CLINIC | Age: 89
End: 2024-02-21
Payer: MEDICARE

## 2024-02-21 NOTE — TELEPHONE ENCOUNTER
Please schedule patient for [facility] wellness visit in Dec 2024. Schedule with Holli or Dr. Regan. Please send this encounter to [facility] for lab orders once scheduled.     Thank you-  Doug Almaraz CMA  2/21/2024  Practice Supervisor  [facility]

## 2024-03-01 ENCOUNTER — OFFICE VISIT (OUTPATIENT)
Dept: HEMATOLOGY/ONCOLOGY | Facility: CLINIC | Age: 89
End: 2024-03-01
Payer: MEDICARE

## 2024-03-01 VITALS
BODY MASS INDEX: 25.31 KG/M2 | OXYGEN SATURATION: 95 % | SYSTOLIC BLOOD PRESSURE: 135 MMHG | WEIGHT: 147.71 LBS | TEMPERATURE: 97.5 F | DIASTOLIC BLOOD PRESSURE: 64 MMHG | HEART RATE: 70 BPM

## 2024-03-01 DIAGNOSIS — D69.6 THROMBOCYTOPENIA (CMS-HCC): ICD-10-CM

## 2024-03-01 PROCEDURE — 3078F DIAST BP <80 MM HG: CPT | Performed by: INTERNAL MEDICINE

## 2024-03-01 PROCEDURE — 3075F SYST BP GE 130 - 139MM HG: CPT | Performed by: INTERNAL MEDICINE

## 2024-03-01 PROCEDURE — 1159F MED LIST DOCD IN RCRD: CPT | Performed by: INTERNAL MEDICINE

## 2024-03-01 PROCEDURE — 99214 OFFICE O/P EST MOD 30 MIN: CPT | Performed by: INTERNAL MEDICINE

## 2024-03-01 PROCEDURE — 1036F TOBACCO NON-USER: CPT | Performed by: INTERNAL MEDICINE

## 2024-03-01 PROCEDURE — 1157F ADVNC CARE PLAN IN RCRD: CPT | Performed by: INTERNAL MEDICINE

## 2024-03-01 PROCEDURE — 1126F AMNT PAIN NOTED NONE PRSNT: CPT | Performed by: INTERNAL MEDICINE

## 2024-03-01 ASSESSMENT — PAIN SCALES - GENERAL: PAINLEVEL: 0-NO PAIN

## 2024-03-01 NOTE — PROGRESS NOTES
Patient ID: Alexia Amaya is a 88 y.o. female.  Referring Physician: Albin Villavicencio MD  [address] [facility], [place] [address] [place] [address]  Primary Care Provider: Wilton Regan DO  Visit Type: Initial Visit      Subjective    HPI  The patient is an 88-year-old woman referred to me for further evaluation and management of mild anemia and mild thrombocytopenia.  She has past medical history of hypertension, atrial fibrillation, hypercholesterolemia, hypothyroidism, peripheral vascular disease chronic kidney disease routine CBC revealed WBC 3.9 platelets 145,000/mm³ hemoglobin 13.2 g/dL.  Patient was referred for further evaluation and management    At interview on January 26, 2024 the patient denied a history of fevers, recurrent localized systemic infections, recurrent localized systemic bleeding, night sweats, weight loss, nausea, vomiting, hematemesis, melena, hematochezia, hematuria.    The patient had come for follow-up on March 1, 2024 regarding history of leukopenia and thrombocytopenia.  The patient is asymptomatic but anxious to know results of the tests performed.    Review of Systems   All other systems reviewed and are negative.       Objective   BSA: 1.74 meters squared  /64   Pulse 70   Temp 36.4 °C (97.5 °F) (Temporal)   Wt 67 kg (147 lb 11.3 oz)   SpO2 95%   BMI 25.31 kg/m²      has a past medical history of Personal history of other diseases of the respiratory system (12/26/2020), Personal history of other diseases of the respiratory system (01/29/2019), and Presence of cardiac pacemaker.   has a past surgical history that includes Knee surgery (02/21/2014) and Cataract extraction (04/03/2018).  No family history on file.  Oncology History    No history exists.       Alexia Amaya  reports that she has never smoked. She has never used smokeless tobacco.  She  reports no history of alcohol use.  She  reports no history of drug use.    Physical Exam  Constitutional:        Appearance: Normal appearance.   HENT:      Head: Normocephalic and atraumatic.      Nose: Nose normal.      Mouth/Throat:      Mouth: Mucous membranes are moist.      Pharynx: Oropharynx is clear.   Eyes:      Extraocular Movements: Extraocular movements intact.      Conjunctiva/sclera: Conjunctivae normal.      Pupils: Pupils are equal, round, and reactive to light.   Cardiovascular:      Rate and Rhythm: Normal rate and regular rhythm.   Pulmonary:      Effort: Pulmonary effort is normal.      Breath sounds: Normal breath sounds.   Abdominal:      General: Abdomen is flat. Bowel sounds are normal.      Palpations: Abdomen is soft.   Musculoskeletal:         General: Normal range of motion.      Cervical back: Normal range of motion and neck supple.   Neurological:      General: No focal deficit present.      Mental Status: She is alert and oriented to person, place, and time. Mental status is at baseline.   Psychiatric:         Mood and Affect: Mood normal.         Behavior: Behavior normal.         Thought Content: Thought content normal.         Judgment: Judgment normal.         WBC   Date/Time Value Ref Range Status   01/26/2024 11:49 AM 6.0 4.4 - 11.3 x10*3/uL Final   11/29/2023 08:00 AM 3.9 (L) 4.4 - 11.3 x10*3/uL Final   09/07/2023 09:27 AM 5.8 4.4 - 11.3 x10E9/L Final   02/28/2023 08:25 AM 5.1 4.4 - 11.3 x10E9/L Final   10/18/2022 08:01 AM 4.0 (L) 4.4 - 11.3 x10E9/L Final     nRBC   Date Value Ref Range Status   01/26/2024   Final     Comment:     Not Measured   11/29/2023 0.0 0.0 - 0.0 /100 WBCs Final   09/07/2023 0.0 0.0 - 0.0 /100 WBC Final   02/28/2023 0.0 0.0 - 0.0 /100 WBC Final   10/18/2022 0.0 0.0 - 0.0 /100 WBC Final     RBC   Date Value Ref Range Status   01/26/2024 4.33 4.00 - 5.20 x10*6/uL Final   11/29/2023 4.09 4.00 - 5.20 x10*6/uL Final   09/07/2023 4.23 4.00 - 5.20 x10E12/L Final   02/28/2023 4.35 4.00 - 5.20 x10E12/L Final   10/18/2022 4.28 4.00 - 5.20 x10E12/L Final     Hemoglobin   Date  "Value Ref Range Status   01/26/2024 14.3 12.0 - 16.0 g/dL Final   11/29/2023 13.6 12.0 - 16.0 g/dL Final   09/07/2023 13.8 12.0 - 16.0 g/dL Final   02/28/2023 14.2 12.0 - 16.0 g/dL Final   10/18/2022 14.1 12.0 - 16.0 g/dL Final     Hematocrit   Date Value Ref Range Status   01/26/2024 42.3 36.0 - 46.0 % Final   11/29/2023 43.9 36.0 - 46.0 % Final   09/07/2023 42.2 36.0 - 46.0 % Final   02/28/2023 42.7 36.0 - 46.0 % Final   10/18/2022 43.7 36.0 - 46.0 % Final     MCV   Date/Time Value Ref Range Status   01/26/2024 11:49 AM 98 80 - 100 fL Final   11/29/2023 08:00  (H) 80 - 100 fL Final   09/07/2023 09:27  80 - 100 fL Final   02/28/2023 08:25 AM 98 80 - 100 fL Final   10/18/2022 08:01  (H) 80 - 100 fL Final     MCH   Date/Time Value Ref Range Status   01/26/2024 11:49 AM 33.0 26.0 - 34.0 pg Final   11/29/2023 08:00 AM 33.3 26.0 - 34.0 pg Final     MCHC   Date/Time Value Ref Range Status   01/26/2024 11:49 AM 33.8 32.0 - 36.0 g/dL Final   11/29/2023 08:00 AM 31.0 (L) 32.0 - 36.0 g/dL Final   09/07/2023 09:27 AM 32.7 32.0 - 36.0 g/dL Final   02/28/2023 08:25 AM 33.3 32.0 - 36.0 g/dL Final   10/18/2022 08:01 AM 32.3 32.0 - 36.0 g/dL Final     RDW   Date/Time Value Ref Range Status   01/26/2024 11:49 AM 13.1 11.5 - 14.5 % Final   11/29/2023 08:00 AM 13.3 11.5 - 14.5 % Final   09/07/2023 09:27 AM 13.7 11.5 - 14.5 % Final   02/28/2023 08:25 AM 13.2 11.5 - 14.5 % Final   10/18/2022 08:01 AM 13.4 11.5 - 14.5 % Final     Platelets   Date/Time Value Ref Range Status   01/26/2024 11:49  (L) 150 - 450 x10*3/uL Final   11/29/2023 08:00  (L) 150 - 450 x10*3/uL Final   09/07/2023 09:27  150 - 450 x10E9/L Final   02/28/2023 08:25  150 - 450 x10E9/L Final   10/18/2022 08:01  150 - 450 x10E9/L Final     No results found for: \"MPV\"  Neutrophils %   Date/Time Value Ref Range Status   01/26/2024 11:49 AM 59.0 40.0 - 80.0 % Final   11/29/2023 08:00 AM 50.4 40.0 - 80.0 % Final   10/18/2022 " 08:01 AM 40.9 40.0 - 80.0 % Final   10/15/2021 07:53 AM 39.9 40.0 - 80.0 % Final   10/06/2020 08:35 AM 45.7 40.0 - 80.0 % Final     Immature Granulocytes %, Automated   Date/Time Value Ref Range Status   01/26/2024 11:49 AM 0.2 0.0 - 0.9 % Final     Comment:     Immature Granulocyte Count (IG) includes promyelocytes, myelocytes and metamyelocytes but does not include bands. Percent differential counts (%) should be interpreted in the context of the absolute cell counts (cells/UL).   11/29/2023 08:00 AM 0.3 0.0 - 0.9 % Final     Comment:     Immature Granulocyte Count (IG) includes promyelocytes, myelocytes and metamyelocytes but does not include bands. Percent differential counts (%) should be interpreted in the context of the absolute cell counts (cells/UL).   10/18/2022 08:01 AM 0.2 0.0 - 0.9 % Final     Comment:      Immature Granulocyte Count (IG) includes promyelocytes,    myelocytes and metamyelocytes but does not include bands.   Percent differential counts (%) should be interpreted in the   context of the absolute cell counts (cells/L).     10/15/2021 07:53 AM 0.2 0.0 - 0.9 % Final     Comment:      Immature Granulocyte Count (IG) includes promyelocytes,    myelocytes and metamyelocytes but does not include bands.   Percent differential counts (%) should be interpreted in the   context of the absolute cell counts (cells/L).     10/06/2020 08:35 AM 0.2 0.0 - 0.9 % Final     Comment:      Immature Granulocyte Count (IG) includes promyelocytes,    myelocytes and metamyelocytes but does not include bands.   Percent differential counts (%) should be interpreted in the   context of the absolute cell counts (cells/L).       Lymphocytes %   Date/Time Value Ref Range Status   01/26/2024 11:49 AM 29.9 13.0 - 44.0 % Final   11/29/2023 08:00 AM 32.9 13.0 - 44.0 % Final   10/18/2022 08:01 AM 36.1 13.0 - 44.0 % Final   10/15/2021 07:53 AM 38.3 13.0 - 44.0 % Final   10/06/2020 08:35 AM 35.8 13.0 - 44.0 % Final      Monocytes %   Date/Time Value Ref Range Status   01/26/2024 11:49 AM 8.2 2.0 - 10.0 % Final   11/29/2023 08:00 AM 9.5 2.0 - 10.0 % Final   10/18/2022 08:01 AM 11.4 2.0 - 10.0 % Final   10/15/2021 07:53 AM 12.9 2.0 - 10.0 % Final   10/06/2020 08:35 AM 11.2 2.0 - 10.0 % Final     Eosinophils %   Date/Time Value Ref Range Status   01/26/2024 11:49 AM 2.2 0.0 - 6.0 % Final   11/29/2023 08:00 AM 5.9 0.0 - 6.0 % Final   10/18/2022 08:01 AM 10.2 0.0 - 6.0 % Final   10/15/2021 07:53 AM 7.4 0.0 - 6.0 % Final   10/06/2020 08:35 AM 5.9 0.0 - 6.0 % Final     Basophils %   Date/Time Value Ref Range Status   01/26/2024 11:49 AM 0.5 0.0 - 2.0 % Final   11/29/2023 08:00 AM 1.0 0.0 - 2.0 % Final   10/18/2022 08:01 AM 1.2 0.0 - 2.0 % Final   10/15/2021 07:53 AM 1.3 0.0 - 2.0 % Final   10/06/2020 08:35 AM 1.2 0.0 - 2.0 % Final     Neutrophils Absolute   Date/Time Value Ref Range Status   01/26/2024 11:49 AM 3.54 1.60 - 5.50 x10*3/uL Final     Comment:     Percent differential counts (%) should be interpreted in the context of the absolute cell counts (cells/uL).   11/29/2023 08:00 AM 1.96 1.60 - 5.50 x10*3/uL Final     Comment:     Percent differential counts (%) should be interpreted in the context of the absolute cell counts (cells/uL).   10/18/2022 08:01 AM 1.64 1.60 - 5.50 x10E9/L Final   10/15/2021 07:53 AM 1.82 1.60 - 5.50 x10E9/L Final   10/06/2020 08:35 AM 2.24 1.60 - 5.50 x10E9/L Final     Immature Granulocytes Absolute, Automated   Date/Time Value Ref Range Status   01/26/2024 11:49 AM 0.01 0.00 - 0.50 x10*3/uL Final   11/29/2023 08:00 AM 0.01 0.00 - 0.50 x10*3/uL Final     Lymphocytes Absolute   Date/Time Value Ref Range Status   01/26/2024 11:49 AM 1.79 0.80 - 3.00 x10*3/uL Final   11/29/2023 08:00 AM 1.28 0.80 - 3.00 x10*3/uL Final   10/18/2022 08:01 AM 1.45 0.80 - 3.00 x10E9/L Final   10/15/2021 07:53 AM 1.75 0.80 - 3.00 x10E9/L Final   10/06/2020 08:35 AM 1.76 0.80 - 3.00 x10E9/L Final     Monocytes Absolute  "  Date/Time Value Ref Range Status   01/26/2024 11:49 AM 0.49 0.05 - 0.80 x10*3/uL Final   11/29/2023 08:00 AM 0.37 0.05 - 0.80 x10*3/uL Final   10/18/2022 08:01 AM 0.46 0.05 - 0.80 x10E9/L Final   10/15/2021 07:53 AM 0.59 0.05 - 0.80 x10E9/L Final   10/06/2020 08:35 AM 0.55 0.05 - 0.80 x10E9/L Final     Eosinophils Absolute   Date/Time Value Ref Range Status   01/26/2024 11:49 AM 0.13 0.00 - 0.40 x10*3/uL Final   11/29/2023 08:00 AM 0.23 0.00 - 0.40 x10*3/uL Final   10/18/2022 08:01 AM 0.41 (H) 0.00 - 0.40 x10E9/L Final   10/15/2021 07:53 AM 0.34 0.00 - 0.40 x10E9/L Final   10/06/2020 08:35 AM 0.29 0.00 - 0.40 x10E9/L Final     Basophils Absolute   Date/Time Value Ref Range Status   01/26/2024 11:49 AM 0.03 0.00 - 0.10 x10*3/uL Final   11/29/2023 08:00 AM 0.04 0.00 - 0.10 x10*3/uL Final   10/18/2022 08:01 AM 0.05 0.00 - 0.10 x10E9/L Final   10/15/2021 07:53 AM 0.06 0.00 - 0.10 x10E9/L Final   10/06/2020 08:35 AM 0.06 0.00 - 0.10 x10E9/L Final       No components found for: \"PT\"  No results found for: \"APTT\"    Assessment/Plan    The patient is an 88-year-old woman referred to me for further evaluation and management of mild anemia and mild thrombocytopenia.  She has past medical history of hypertension, atrial fibrillation, hypercholesterolemia, hypothyroidism, peripheral vascular disease chronic kidney disease routine CBC revealed WBC 3.9 platelets 145,000/mm³ hemoglobin 13.2 g/dL.  Patient was referred for further evaluation and management.    Physical examination within normal limits.  Reviewed 2 years of lab data with the patient the patient has mild leukopenia mild thrombocytopenia without untoward manifestations of recurrent localized systemic infections and recurrent localized or systemic bleeding.  Differential diagnosis is wide such as decreased production, immune destruction, deficiency state, drug-induced, myelodysplastic syndrome versus others.  Would like to evaluate further to rule out any correctable " deficiencies.  The patient understood appreciated all the details provided and was grateful return in 4 weeks.  The patient had come for follow-up on March 1, 2024 regarding history of leukopenia and thrombocytopenia.  The patient is asymptomatic.  Reviewed lab data with the patient CBC on January 26, 2024 revealed WBC 6, hemoglobin 14.3 g/dL platelets 144,000/mm³.  Have recommended clinical follow-up.  Vitamin B12, folate, iron indicis, TSH, serum protein electrophoresis, immunoelectrophoresis, free light chains are all in reference range.    Mildly elevated creatinine 1.1 mg/dL:    Follow-up with nephrology services.    Return in 6 months     Diagnoses and all orders for this visit:  Thrombocytopenia (CMS/HCC)  -     Referral to Hematology and Oncology  -     CBC and Auto Differential; Future  -     Comprehensive Metabolic Panel; Future  -     Ferritin; Future  -     Folate; Future  -     Iron and TIBC; Future  -     TSH with reflex to Free T4 if abnormal; Future  -     Vitamin B12; Future  -     Vitamin D 25-Hydroxy,Total (for eval of Vitamin D levels); Future  -     [place]-[facility]/Lambda Free Light Chain, Serum; Future  -     Serum Protein Electrophoresis + Immunofixation; Future  -     Clinic Appointment Request; Future         Albin Villavicencio MD                       Patient for follow up in 6 months with labs prior.  Labs at [facility].  Patient independently ambulatory off unit in [facility] and without complaints.  Gait steady.  Call back instructions reviewed.  Patient verbalized understanding.

## 2024-03-01 NOTE — PROGRESS NOTES
Patient for follow up in 6 months with labs prior.  Labs at [facility].  Patient independently ambulatory off unit in [facility] and without complaints.  Gait steady.  Call back instructions reviewed.  Patient verbalized understanding.

## 2024-03-22 ENCOUNTER — LAB (OUTPATIENT)
Dept: LAB | Facility: LAB | Age: 89
End: 2024-03-22
Payer: MEDICARE

## 2024-03-22 DIAGNOSIS — N18.32 CHRONIC KIDNEY DISEASE, STAGE 3B (MULTI): ICD-10-CM

## 2024-03-22 DIAGNOSIS — N25.81 SECONDARY HYPERPARATHYROIDISM OF RENAL ORIGIN (MULTI): Primary | ICD-10-CM

## 2024-03-22 LAB
25(OH)D3 SERPL-MCNC: 41 NG/ML (ref 30–100)
ALBUMIN SERPL BCP-MCNC: 4.2 G/DL (ref 3.4–5)
ANION GAP SERPL CALC-SCNC: 16 MMOL/L (ref 10–20)
BUN SERPL-MCNC: 22 MG/DL (ref 6–23)
CALCIUM SERPL-MCNC: 9.8 MG/DL (ref 8.6–10.6)
CHLORIDE SERPL-SCNC: 101 MMOL/L (ref 98–107)
CO2 SERPL-SCNC: 28 MMOL/L (ref 21–32)
CREAT SERPL-MCNC: 1.13 MG/DL (ref 0.5–1.05)
CREAT UR-MCNC: 38.5 MG/DL (ref 20–320)
EGFRCR SERPLBLD CKD-EPI 2021: 47 ML/MIN/1.73M*2
ERYTHROCYTE [DISTWIDTH] IN BLOOD BY AUTOMATED COUNT: 13.4 % (ref 11.5–14.5)
GLUCOSE SERPL-MCNC: 99 MG/DL (ref 74–99)
HCT VFR BLD AUTO: 42.1 % (ref 36–46)
HGB BLD-MCNC: 14.1 G/DL (ref 12–16)
MCH RBC QN AUTO: 32.9 PG (ref 26–34)
MCHC RBC AUTO-ENTMCNC: 33.5 G/DL (ref 32–36)
MCV RBC AUTO: 98 FL (ref 80–100)
MICROALBUMIN UR-MCNC: 11.5 MG/L
MICROALBUMIN/CREAT UR: 29.9 UG/MG CREAT
NRBC BLD-RTO: 0 /100 WBCS (ref 0–0)
PHOSPHATE SERPL-MCNC: 4 MG/DL (ref 2.5–4.9)
PLATELET # BLD AUTO: 162 X10*3/UL (ref 150–450)
POTASSIUM SERPL-SCNC: 4.5 MMOL/L (ref 3.5–5.3)
PTH-INTACT SERPL-MCNC: 54.2 PG/ML (ref 18.5–88)
RBC # BLD AUTO: 4.29 X10*6/UL (ref 4–5.2)
SODIUM SERPL-SCNC: 140 MMOL/L (ref 136–145)
WBC # BLD AUTO: 5.9 X10*3/UL (ref 4.4–11.3)

## 2024-03-22 PROCEDURE — 82043 UR ALBUMIN QUANTITATIVE: CPT

## 2024-03-22 PROCEDURE — 80069 RENAL FUNCTION PANEL: CPT

## 2024-03-22 PROCEDURE — 82306 VITAMIN D 25 HYDROXY: CPT

## 2024-03-22 PROCEDURE — 83970 ASSAY OF PARATHORMONE: CPT

## 2024-03-22 PROCEDURE — 82570 ASSAY OF URINE CREATININE: CPT

## 2024-03-22 PROCEDURE — 85027 COMPLETE CBC AUTOMATED: CPT

## 2024-03-22 PROCEDURE — 36415 COLL VENOUS BLD VENIPUNCTURE: CPT

## 2024-09-05 ENCOUNTER — APPOINTMENT (OUTPATIENT)
Dept: HEMATOLOGY/ONCOLOGY | Facility: CLINIC | Age: 89
End: 2024-09-05
Payer: MEDICARE

## 2024-09-18 ENCOUNTER — OFFICE VISIT (OUTPATIENT)
Dept: PRIMARY CARE | Facility: CLINIC | Age: 89
End: 2024-09-18
Payer: MEDICARE

## 2024-09-18 VITALS
WEIGHT: 144 LBS | OXYGEN SATURATION: 96 % | HEART RATE: 69 BPM | SYSTOLIC BLOOD PRESSURE: 146 MMHG | DIASTOLIC BLOOD PRESSURE: 78 MMHG | BODY MASS INDEX: 24.68 KG/M2

## 2024-09-18 DIAGNOSIS — N18.32 STAGE 3B CHRONIC KIDNEY DISEASE (MULTI): ICD-10-CM

## 2024-09-18 DIAGNOSIS — R39.15 URGENCY OF URINATION: ICD-10-CM

## 2024-09-18 DIAGNOSIS — I73.9 PERIPHERAL VASCULAR DISEASE (CMS-HCC): ICD-10-CM

## 2024-09-18 DIAGNOSIS — R35.0 FREQUENCY OF URINATION: Primary | ICD-10-CM

## 2024-09-18 DIAGNOSIS — N25.81 SECONDARY HYPERPARATHYROIDISM OF RENAL ORIGIN (MULTI): ICD-10-CM

## 2024-09-18 LAB
POC APPEARANCE, URINE: ABNORMAL
POC BILIRUBIN, URINE: NEGATIVE
POC BLOOD, URINE: ABNORMAL
POC COLOR, URINE: YELLOW
POC GLUCOSE, URINE: NEGATIVE MG/DL
POC KETONES, URINE: NEGATIVE MG/DL
POC LEUKOCYTES, URINE: ABNORMAL
POC NITRITE,URINE: POSITIVE
POC PH, URINE: 5.5 PH
POC PROTEIN, URINE: NEGATIVE MG/DL
POC SPECIFIC GRAVITY, URINE: 1.01
POC UROBILINOGEN, URINE: 0.2 EU/DL

## 2024-09-18 PROCEDURE — 1159F MED LIST DOCD IN RCRD: CPT | Performed by: FAMILY MEDICINE

## 2024-09-18 PROCEDURE — 81003 URINALYSIS AUTO W/O SCOPE: CPT | Performed by: FAMILY MEDICINE

## 2024-09-18 PROCEDURE — 87086 URINE CULTURE/COLONY COUNT: CPT

## 2024-09-18 PROCEDURE — 1036F TOBACCO NON-USER: CPT | Performed by: FAMILY MEDICINE

## 2024-09-18 PROCEDURE — 87186 SC STD MICRODIL/AGAR DIL: CPT

## 2024-09-18 PROCEDURE — 99214 OFFICE O/P EST MOD 30 MIN: CPT | Performed by: FAMILY MEDICINE

## 2024-09-18 PROCEDURE — 3078F DIAST BP <80 MM HG: CPT | Performed by: FAMILY MEDICINE

## 2024-09-18 PROCEDURE — 3077F SYST BP >= 140 MM HG: CPT | Performed by: FAMILY MEDICINE

## 2024-09-18 PROCEDURE — 1160F RVW MEDS BY RX/DR IN RCRD: CPT | Performed by: FAMILY MEDICINE

## 2024-09-18 PROCEDURE — 1157F ADVNC CARE PLAN IN RCRD: CPT | Performed by: FAMILY MEDICINE

## 2024-09-18 RX ORDER — NITROFURANTOIN 25; 75 MG/1; MG/1
100 CAPSULE ORAL 2 TIMES DAILY
Qty: 14 CAPSULE | Refills: 0 | Status: SHIPPED | OUTPATIENT
Start: 2024-09-18 | End: 2024-09-25

## 2024-09-18 ASSESSMENT — ENCOUNTER SYMPTOMS
VOMITING: 0
HEMATURIA: 0
FLANK PAIN: 0
SWEATS: 0
NAUSEA: 0
FREQUENCY: 1
CHILLS: 0

## 2024-09-18 NOTE — PATIENT INSTRUCTIONS
1. Start antibiotics sent in.  2. The following were reviewed with the patient  Pt should call us if she has:  Symptoms that last longer than 3 days after taking the antibiotics.  Symptoms come back after resolving.  Fever (> 100.5'F) while on antibiotics.  Pink, red, or brown color to urine after 3 days.    Current Outpatient Medications   Medication Sig Dispense Refill    amLODIPine (Norvasc) 5 mg tablet Take 1 tablet (5 mg) by mouth once daily. 90 tablet 3    ammonium lactate (Amlactin) 12 % cream Apply 1 Application topically.      atorvastatin (Lipitor) 10 mg tablet Take 1 tablet (10 mg) by mouth once daily. 90 tablet 3    Ca-D3-mag-zinc--yolanda-boron (Caltrate 600-D Plus Minerals) 600 mg calcium- 800 unit-40 mg tablet,chewable Chew 1 tablet once daily.      calcium carbonate-vitamin D3 600 mg-20 mcg (800 unit) tablet Take 1 tablet by mouth once daily.      folic acid (Folvite) 1 mg tablet Take 1 tablet (1 mg) by mouth once daily. 90 tablet 3    furosemide (Lasix) 40 mg tablet Take 1 tablet (40 mg) by mouth once daily.      levothyroxine (Synthroid, Levoxyl) 50 mcg tablet Take 1 tablet (50 mcg) by mouth once daily. 90 tablet 3    multivitamin (Multiple Vitamins) tablet Take 1 tablet by mouth once daily.      rivaroxaban (Xarelto) 20 mg tablet Take 1 tablet (20 mg) by mouth once daily.      nitrofurantoin, macrocrystal-monohydrate, (Macrobid) 100 mg capsule Take 1 capsule (100 mg) by mouth 2 times a day for 7 days. 14 capsule 0     No current facility-administered medications for this visit.

## 2024-09-18 NOTE — PROGRESS NOTES
Subjective   Patient ID: Alexia Amaya is a 89 y.o. female who presents for UTI (Pt is here for a UTI started last night burning, no bleeding).  Today she is accompanied by alone.     UTI   This is a new problem. The current episode started yesterday. The problem occurs every urination. The problem has been gradually worsening. The quality of the pain is described as aching and burning. There is No history of pyelonephritis. Associated symptoms include frequency, hesitancy and urgency. Pertinent negatives include no chills, discharge, flank pain, hematuria, nausea, possible pregnancy, sweats or vomiting. She has tried increased fluids for the symptoms. The treatment provided mild relief.       Review of Systems   Constitutional:  Negative for chills.   Gastrointestinal:  Negative for nausea and vomiting.   Genitourinary:  Positive for frequency, hesitancy and urgency. Negative for flank pain and hematuria.       Objective   /78   Pulse 69   Wt 65.3 kg (144 lb)   SpO2 96%   BMI 24.68 kg/m²   BSA: 1.72 meters squared  Growth percentiles: Facility age limit for growth %linda is 20 years. Facility age limit for growth %linda is 20 years.     Physical Exam  Vitals and nursing note reviewed.   Constitutional:       General: She is not in acute distress.     Appearance: Normal appearance. She is normal weight. She is not ill-appearing.   HENT:      Head: Normocephalic.      Right Ear: Tympanic membrane, ear canal and external ear normal.      Left Ear: Tympanic membrane, ear canal and external ear normal.      Nose: Nose normal. No rhinorrhea.      Mouth/Throat:      Mouth: Mucous membranes are moist.      Pharynx: Oropharynx is clear.   Eyes:      Extraocular Movements: Extraocular movements intact.      Conjunctiva/sclera: Conjunctivae normal.      Pupils: Pupils are equal, round, and reactive to light.   Cardiovascular:      Rate and Rhythm: Normal rate and regular rhythm.      Pulses: Normal pulses.       Heart sounds: Normal heart sounds. No murmur heard.     No friction rub. No gallop.   Pulmonary:      Effort: Pulmonary effort is normal. No respiratory distress.      Breath sounds: Normal breath sounds. No stridor. No wheezing, rhonchi or rales.   Chest:      Chest wall: No tenderness.   Abdominal:      General: Abdomen is flat. There is no distension.      Palpations: Abdomen is soft. There is no mass.      Tenderness: There is abdominal tenderness in the suprapubic area and left lower quadrant. There is no right CVA tenderness, left CVA tenderness, guarding or rebound. Negative signs include Marquez's sign, Rovsing's sign, McBurney's sign, psoas sign and obturator sign.      Hernia: No hernia is present.   Musculoskeletal:         General: Normal range of motion.      Cervical back: Normal range of motion and neck supple. No rigidity or tenderness.      Right lower leg: No edema.      Left lower leg: No edema.   Lymphadenopathy:      Cervical: No cervical adenopathy.   Skin:     General: Skin is warm and dry.   Neurological:      General: No focal deficit present.      Mental Status: She is alert and oriented to person, place, and time.      Sensory: No sensory deficit.      Motor: No weakness.      Coordination: Coordination normal.   Psychiatric:         Mood and Affect: Mood normal.         Behavior: Behavior normal.         Thought Content: Thought content normal.         Judgment: Judgment normal.         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 90 tablet 3    ammonium lactate (Amlactin) 12 % cream Apply 1 Application topically.      atorvastatin (Lipitor) 10 mg tablet Take 1 tablet (10 mg) by mouth once daily. 90 tablet 3    Ca-D3-mag-zinc--yolanda-boron (Caltrate 600-D Plus Minerals) 600 mg calcium- 800 unit-40 mg tablet,chewable Chew 1 tablet once daily.      calcium carbonate-vitamin D3 600 mg-20 mcg (800 unit) tablet Take 1 tablet by mouth once daily.      folic acid (Folvite) 1 mg tablet Take 1 tablet (1 mg) by mouth once daily. 90 tablet 3    furosemide (Lasix) 40 mg tablet Take 1 tablet (40 mg) by mouth once daily.      levothyroxine (Synthroid, Levoxyl) 50 mcg tablet Take 1 tablet (50 mcg) by mouth once daily. 90 tablet 3    multivitamin (Multiple Vitamins) tablet Take 1 tablet by mouth once daily.      rivaroxaban (Xarelto) 20 mg tablet Take 1 tablet (20 mg) by mouth once daily.      nitrofurantoin, macrocrystal-monohydrate, (Macrobid) 100 mg capsule Take 1 capsule (100 mg) by mouth 2 times a day for 7 days. 14 capsule 0     No current facility-administered medications for this visit.     1. Start antibiotics sent in.  2. The following were reviewed with the patient  Pt should call us if she has:  Symptoms that last longer than 3 days after taking the antibiotics.  Symptoms come back after resolving.  Fever (> 100.5'F) while on antibiotics.  Pink, red, or brown color to urine after 3 days.

## 2024-09-21 LAB — BACTERIA UR CULT: ABNORMAL

## 2024-09-23 DIAGNOSIS — I10 ESSENTIAL HYPERTENSION: ICD-10-CM

## 2024-09-23 DIAGNOSIS — E03.9 HYPOTHYROIDISM, UNSPECIFIED TYPE: ICD-10-CM

## 2024-09-23 DIAGNOSIS — E78.5 HYPERLIPIDEMIA, UNSPECIFIED HYPERLIPIDEMIA TYPE: ICD-10-CM

## 2024-10-01 ENCOUNTER — LAB (OUTPATIENT)
Dept: LAB | Facility: LAB | Age: 89
End: 2024-10-01
Payer: MEDICARE

## 2024-10-01 DIAGNOSIS — E78.5 HYPERLIPIDEMIA, UNSPECIFIED HYPERLIPIDEMIA TYPE: ICD-10-CM

## 2024-10-01 DIAGNOSIS — Q61.00 CONGENITAL RENAL CYST, UNSPECIFIED: ICD-10-CM

## 2024-10-01 DIAGNOSIS — N18.32 CHRONIC KIDNEY DISEASE, STAGE 3B (MULTI): Primary | ICD-10-CM

## 2024-10-01 DIAGNOSIS — E78.5 HYPERLIPIDEMIA, UNSPECIFIED: ICD-10-CM

## 2024-10-01 DIAGNOSIS — E03.9 HYPOTHYROIDISM, UNSPECIFIED TYPE: ICD-10-CM

## 2024-10-01 DIAGNOSIS — N25.81 SECONDARY HYPERPARATHYROIDISM OF RENAL ORIGIN (MULTI): ICD-10-CM

## 2024-10-01 DIAGNOSIS — I10 ESSENTIAL HYPERTENSION: ICD-10-CM

## 2024-10-01 LAB
ALBUMIN SERPL BCP-MCNC: 4.2 G/DL (ref 3.4–5)
ALP SERPL-CCNC: 68 U/L (ref 33–136)
ALT SERPL W P-5'-P-CCNC: 19 U/L (ref 7–45)
ANION GAP SERPL CALC-SCNC: 13 MMOL/L (ref 10–20)
APPEARANCE UR: CLEAR
AST SERPL W P-5'-P-CCNC: 27 U/L (ref 9–39)
BASOPHILS # BLD AUTO: 0.06 X10*3/UL (ref 0–0.1)
BASOPHILS NFR BLD AUTO: 0.9 %
BILIRUB SERPL-MCNC: 1.3 MG/DL (ref 0–1.2)
BILIRUB UR STRIP.AUTO-MCNC: NEGATIVE MG/DL
BUN SERPL-MCNC: 26 MG/DL (ref 6–23)
CALCIUM SERPL-MCNC: 9.8 MG/DL (ref 8.6–10.6)
CHLORIDE SERPL-SCNC: 104 MMOL/L (ref 98–107)
CHOLEST SERPL-MCNC: 181 MG/DL (ref 0–199)
CHOLESTEROL/HDL RATIO: 2.1
CO2 SERPL-SCNC: 30 MMOL/L (ref 21–32)
COLOR UR: ABNORMAL
CREAT SERPL-MCNC: 1.36 MG/DL (ref 0.5–1.05)
CREAT UR-MCNC: 50.7 MG/DL (ref 20–320)
EGFRCR SERPLBLD CKD-EPI 2021: 38 ML/MIN/1.73M*2
EOSINOPHIL # BLD AUTO: 0.19 X10*3/UL (ref 0–0.4)
EOSINOPHIL NFR BLD AUTO: 2.9 %
ERYTHROCYTE [DISTWIDTH] IN BLOOD BY AUTOMATED COUNT: 13.3 % (ref 11.5–14.5)
GLUCOSE SERPL-MCNC: 96 MG/DL (ref 74–99)
GLUCOSE UR STRIP.AUTO-MCNC: NORMAL MG/DL
HCT VFR BLD AUTO: 40.2 % (ref 36–46)
HDLC SERPL-MCNC: 85.3 MG/DL
HGB BLD-MCNC: 13.8 G/DL (ref 12–16)
IMM GRANULOCYTES # BLD AUTO: 0.02 X10*3/UL (ref 0–0.5)
IMM GRANULOCYTES NFR BLD AUTO: 0.3 % (ref 0–0.9)
KETONES UR STRIP.AUTO-MCNC: NEGATIVE MG/DL
LDLC SERPL CALC-MCNC: 85 MG/DL
LEUKOCYTE ESTERASE UR QL STRIP.AUTO: ABNORMAL
LYMPHOCYTES # BLD AUTO: 1.39 X10*3/UL (ref 0.8–3)
LYMPHOCYTES NFR BLD AUTO: 21.3 %
MCH RBC QN AUTO: 33 PG (ref 26–34)
MCHC RBC AUTO-ENTMCNC: 34.3 G/DL (ref 32–36)
MCV RBC AUTO: 96 FL (ref 80–100)
MONOCYTES # BLD AUTO: 0.59 X10*3/UL (ref 0.05–0.8)
MONOCYTES NFR BLD AUTO: 9 %
MUCOUS THREADS #/AREA URNS AUTO: NORMAL /LPF
NEUTROPHILS # BLD AUTO: 4.28 X10*3/UL (ref 1.6–5.5)
NEUTROPHILS NFR BLD AUTO: 65.6 %
NITRITE UR QL STRIP.AUTO: NEGATIVE
NON HDL CHOLESTEROL: 96 MG/DL (ref 0–149)
NRBC BLD-RTO: 0 /100 WBCS (ref 0–0)
PH UR STRIP.AUTO: 5.5 [PH]
PHOSPHATE SERPL-MCNC: 3.9 MG/DL (ref 2.5–4.9)
PLATELET # BLD AUTO: 216 X10*3/UL (ref 150–450)
POTASSIUM SERPL-SCNC: 4.7 MMOL/L (ref 3.5–5.3)
PROT SERPL-MCNC: 6.9 G/DL (ref 6.4–8.2)
PROT UR STRIP.AUTO-MCNC: NEGATIVE MG/DL
RBC # BLD AUTO: 4.18 X10*6/UL (ref 4–5.2)
RBC # UR STRIP.AUTO: NEGATIVE /UL
RBC #/AREA URNS AUTO: NORMAL /HPF
SODIUM SERPL-SCNC: 142 MMOL/L (ref 136–145)
SP GR UR STRIP.AUTO: 1.01
SQUAMOUS #/AREA URNS AUTO: NORMAL /HPF
TRIGL SERPL-MCNC: 55 MG/DL (ref 0–149)
TSH SERPL-ACNC: 3.26 MIU/L (ref 0.44–3.98)
UROBILINOGEN UR STRIP.AUTO-MCNC: NORMAL MG/DL
VLDL: 11 MG/DL (ref 0–40)
WBC # BLD AUTO: 6.5 X10*3/UL (ref 4.4–11.3)
WBC #/AREA URNS AUTO: NORMAL /HPF

## 2024-10-01 PROCEDURE — 80061 LIPID PANEL: CPT

## 2024-10-01 PROCEDURE — 82570 ASSAY OF URINE CREATININE: CPT

## 2024-10-01 PROCEDURE — 84443 ASSAY THYROID STIM HORMONE: CPT

## 2024-10-01 PROCEDURE — 85025 COMPLETE CBC W/AUTO DIFF WBC: CPT

## 2024-10-01 PROCEDURE — 84100 ASSAY OF PHOSPHORUS: CPT

## 2024-10-01 PROCEDURE — 36415 COLL VENOUS BLD VENIPUNCTURE: CPT

## 2024-10-01 PROCEDURE — 81001 URINALYSIS AUTO W/SCOPE: CPT

## 2024-10-01 PROCEDURE — 80053 COMPREHEN METABOLIC PANEL: CPT

## 2024-10-08 ENCOUNTER — TELEPHONE (OUTPATIENT)
Dept: SCHEDULING | Age: 89
End: 2024-10-08

## 2024-10-08 ENCOUNTER — APPOINTMENT (OUTPATIENT)
Dept: PRIMARY CARE | Facility: CLINIC | Age: 89
End: 2024-10-08
Payer: MEDICARE

## 2024-10-08 VITALS
WEIGHT: 139 LBS | SYSTOLIC BLOOD PRESSURE: 126 MMHG | HEIGHT: 64 IN | DIASTOLIC BLOOD PRESSURE: 60 MMHG | BODY MASS INDEX: 23.73 KG/M2 | OXYGEN SATURATION: 97 % | HEART RATE: 70 BPM | TEMPERATURE: 96.7 F

## 2024-10-08 DIAGNOSIS — I48.21 PERMANENT ATRIAL FIBRILLATION (MULTI): ICD-10-CM

## 2024-10-08 DIAGNOSIS — F41.9 ANXIETY: ICD-10-CM

## 2024-10-08 DIAGNOSIS — Z71.89 ADVANCE DIRECTIVE DISCUSSED WITH PATIENT: ICD-10-CM

## 2024-10-08 DIAGNOSIS — Z00.00 ROUTINE GENERAL MEDICAL EXAMINATION AT HEALTH CARE FACILITY: ICD-10-CM

## 2024-10-08 DIAGNOSIS — I45.2 RIGHT BUNDLE BRANCH BLOCK WITH LEFT ANTERIOR FASCICULAR BLOCK: ICD-10-CM

## 2024-10-08 DIAGNOSIS — N18.32 STAGE 3B CHRONIC KIDNEY DISEASE (MULTI): ICD-10-CM

## 2024-10-08 DIAGNOSIS — E78.5 HYPERLIPIDEMIA, UNSPECIFIED HYPERLIPIDEMIA TYPE: ICD-10-CM

## 2024-10-08 DIAGNOSIS — M80.00XS AGE-RELATED OSTEOPOROSIS WITH CURRENT PATHOLOGICAL FRACTURE, SEQUELA: ICD-10-CM

## 2024-10-08 DIAGNOSIS — N25.81 SECONDARY HYPERPARATHYROIDISM OF RENAL ORIGIN (MULTI): ICD-10-CM

## 2024-10-08 DIAGNOSIS — D69.6 THROMBOCYTOPENIA (CMS-HCC): ICD-10-CM

## 2024-10-08 DIAGNOSIS — Z71.89 CARDIAC RISK COUNSELING: ICD-10-CM

## 2024-10-08 DIAGNOSIS — I10 ESSENTIAL HYPERTENSION: ICD-10-CM

## 2024-10-08 DIAGNOSIS — I44.0 FIRST DEGREE ATRIOVENTRICULAR BLOCK: ICD-10-CM

## 2024-10-08 DIAGNOSIS — Z00.00 ENCOUNTER FOR ANNUAL WELLNESS VISIT (AWV) IN MEDICARE PATIENT: Primary | ICD-10-CM

## 2024-10-08 DIAGNOSIS — Z95.0 PRESENCE OF CARDIAC PACEMAKER: ICD-10-CM

## 2024-10-08 DIAGNOSIS — I73.9 PERIPHERAL VASCULAR DISEASE (CMS-HCC): ICD-10-CM

## 2024-10-08 DIAGNOSIS — E03.9 HYPOTHYROIDISM, UNSPECIFIED TYPE: ICD-10-CM

## 2024-10-08 DIAGNOSIS — Z00.00 MEDICARE ANNUAL WELLNESS VISIT, SUBSEQUENT: ICD-10-CM

## 2024-10-08 PROBLEM — M17.10 ARTHRITIS OF KNEE: Status: RESOLVED | Noted: 2023-06-02 | Resolved: 2024-10-08

## 2024-10-08 PROBLEM — G56.01 CARPAL TUNNEL SYNDROME OF RIGHT WRIST: Status: RESOLVED | Noted: 2023-06-02 | Resolved: 2024-10-08

## 2024-10-08 PROBLEM — Z98.890 HISTORY OF ATRIOVENTRICULAR NODAL ABLATION: Status: RESOLVED | Noted: 2021-04-06 | Resolved: 2024-10-08

## 2024-10-08 PROBLEM — G47.09 OTHER INSOMNIA: Status: RESOLVED | Noted: 2023-07-10 | Resolved: 2024-10-08

## 2024-10-08 PROBLEM — R60.0 LOCALIZED EDEMA: Status: RESOLVED | Noted: 2023-06-02 | Resolved: 2024-10-08

## 2024-10-08 PROCEDURE — 99497 ADVNCD CARE PLAN 30 MIN: CPT | Performed by: FAMILY MEDICINE

## 2024-10-08 PROCEDURE — G0446 INTENS BEHAVE THER CARDIO DX: HCPCS | Performed by: FAMILY MEDICINE

## 2024-10-08 PROCEDURE — 1123F ACP DISCUSS/DSCN MKR DOCD: CPT | Performed by: FAMILY MEDICINE

## 2024-10-08 PROCEDURE — 3074F SYST BP LT 130 MM HG: CPT | Performed by: FAMILY MEDICINE

## 2024-10-08 PROCEDURE — 99214 OFFICE O/P EST MOD 30 MIN: CPT | Performed by: FAMILY MEDICINE

## 2024-10-08 PROCEDURE — 1157F ADVNC CARE PLAN IN RCRD: CPT | Performed by: FAMILY MEDICINE

## 2024-10-08 PROCEDURE — 1170F FXNL STATUS ASSESSED: CPT | Performed by: FAMILY MEDICINE

## 2024-10-08 PROCEDURE — 1036F TOBACCO NON-USER: CPT | Performed by: FAMILY MEDICINE

## 2024-10-08 PROCEDURE — 3078F DIAST BP <80 MM HG: CPT | Performed by: FAMILY MEDICINE

## 2024-10-08 PROCEDURE — G0439 PPPS, SUBSEQ VISIT: HCPCS | Performed by: FAMILY MEDICINE

## 2024-10-08 PROCEDURE — 1158F ADVNC CARE PLAN TLK DOCD: CPT | Performed by: FAMILY MEDICINE

## 2024-10-08 PROCEDURE — 1159F MED LIST DOCD IN RCRD: CPT | Performed by: FAMILY MEDICINE

## 2024-10-08 PROCEDURE — 1160F RVW MEDS BY RX/DR IN RCRD: CPT | Performed by: FAMILY MEDICINE

## 2024-10-08 PROCEDURE — 99397 PER PM REEVAL EST PAT 65+ YR: CPT | Performed by: FAMILY MEDICINE

## 2024-10-08 ASSESSMENT — ENCOUNTER SYMPTOMS
DIARRHEA: 0
COLOR CHANGE: 0
NERVOUS/ANXIOUS: 0
DIAPHORESIS: 0
FEVER: 0
FREQUENCY: 0
SHORTNESS OF BREATH: 0
EYE ITCHING: 0
LOSS OF SENSATION IN FEET: 0
FACIAL ASYMMETRY: 0
SLEEP DISTURBANCE: 0
PALPITATIONS: 0
WHEEZING: 0
HEMATURIA: 0
DIFFICULTY URINATING: 0
EYE PAIN: 0
ABDOMINAL DISTENTION: 0
RHINORRHEA: 0
ACTIVITY CHANGE: 0
ARTHRALGIAS: 0
VOMITING: 0
WOUND: 0
ADENOPATHY: 0
DECREASED CONCENTRATION: 0
BRUISES/BLEEDS EASILY: 0
CHEST TIGHTNESS: 0
APPETITE CHANGE: 0
APNEA: 0
COUGH: 0
CONSTIPATION: 0
OCCASIONAL FEELINGS OF UNSTEADINESS: 0
MYALGIAS: 0
NUMBNESS: 0
DYSURIA: 0
DEPRESSION: 0

## 2024-10-08 ASSESSMENT — PATIENT HEALTH QUESTIONNAIRE - PHQ9
1. LITTLE INTEREST OR PLEASURE IN DOING THINGS: NOT AT ALL
10. IF YOU CHECKED OFF ANY PROBLEMS, HOW DIFFICULT HAVE THESE PROBLEMS MADE IT FOR YOU TO DO YOUR WORK, TAKE CARE OF THINGS AT HOME, OR GET ALONG WITH OTHER PEOPLE: NOT DIFFICULT AT ALL
2. FEELING DOWN, DEPRESSED OR HOPELESS: NOT AT ALL
SUM OF ALL RESPONSES TO PHQ9 QUESTIONS 1 AND 2: 0

## 2024-10-08 ASSESSMENT — ACTIVITIES OF DAILY LIVING (ADL)
BATHING: INDEPENDENT
DRESSING: INDEPENDENT
TAKING_MEDICATION: INDEPENDENT
GROCERY_SHOPPING: INDEPENDENT
MANAGING_FINANCES: INDEPENDENT
DOING_HOUSEWORK: INDEPENDENT

## 2024-10-08 NOTE — PROGRESS NOTES
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 High   High     Very High     0-19 Y     0 - 119       120 - 144     >/= 145    >/= 160    20-24 Y     0 - 149       150 - 189     >/= 190      ----         >24 Y    30 mg/dL above LDL Cholesterol goal      Thyroid Stimulating Hormone 11/29/2023 3.39  0.44 - 3.98 mIU/L Final     Current Outpatient Medications on File Prior to Visit   Medication Sig Dispense Refill    amLODIPine (Norvasc) 5 mg tablet Take 1 tablet (5 mg) by mouth once daily. 90 tablet 3    ammonium lactate (Amlactin) 12 % cream Apply 1 Application topically.      atorvastatin (Lipitor) 10 mg tablet Take 1 tablet (10 mg) by mouth once daily. 90 tablet 3    Ca-D3-mag-zinc--yolanda-boron (Caltrate 600-D Plus Minerals) 600 mg calcium- 800 unit-40 mg tablet,chewable Chew 1 tablet once daily.      folic acid (Folvite) 1 mg tablet Take 1 tablet (1 mg) by mouth once daily. 90 tablet 3    furosemide (Lasix) 40 mg tablet Take 1 tablet (40 mg) by mouth once daily.      levothyroxine (Synthroid, Levoxyl) 50 mcg tablet Take 1 tablet (50 mcg) by mouth once daily. 90 tablet 3    multivitamin (Multiple Vitamins) tablet Take 1 tablet by mouth once daily.      rivaroxaban (Xarelto) 20 mg tablet Take 1 tablet (20 mg) by mouth once daily.      [DISCONTINUED] calcium carbonate-vitamin D3 600  mg-20 mcg (800 unit) tablet Take 1 tablet by mouth once daily.       No current facility-administered medications on file prior to visit.     No images are attached to the encounter.            Assessment/Plan   Problem List Items Addressed This Visit             ICD-10-CM    Permanent atrial fibrillation (Multi) I48.21     Anticoagulated being followed by cardiology.  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

## 2024-10-08 NOTE — PATIENT INSTRUCTIONS
Continue to follow-up with cardiology regarding AV block patient has pacemaker in place.  Because age.  Patient anticoagulated.    Patient following up with nephrology regarding chronic kidney disease    Labs have been reviewed.  Medications reviewed and reconciled.    Anxiety has been under good control.    Discussion about shingles vaccination recommend doing it at the pharmacy.

## 2024-10-08 NOTE — TELEPHONE ENCOUNTER
Patient called in to cancel her upcoming appt with Dr. Villavicencio. She stated she saw her PCP today, 10/08/24, and he is stated she does not need to have appt with Dr. Villavicencio. He is going to follow her for care.

## 2024-10-10 ENCOUNTER — APPOINTMENT (OUTPATIENT)
Dept: HEMATOLOGY/ONCOLOGY | Facility: CLINIC | Age: 89
End: 2024-10-10
Payer: MEDICARE

## 2024-11-22 ENCOUNTER — TELEPHONE (OUTPATIENT)
Dept: PRIMARY CARE | Facility: CLINIC | Age: 89
End: 2024-11-22

## 2024-11-22 NOTE — TELEPHONE ENCOUNTER
Patient called today stating that she fell on last night on her rear. She states that it is painful for her to walk but she dont think anything is broken. Please advise patient on what she should do from here

## 2024-11-23 ENCOUNTER — OFFICE VISIT (OUTPATIENT)
Dept: PRIMARY CARE | Facility: CLINIC | Age: 89
End: 2024-11-23
Payer: MEDICARE

## 2024-11-23 VITALS
DIASTOLIC BLOOD PRESSURE: 60 MMHG | HEART RATE: 69 BPM | SYSTOLIC BLOOD PRESSURE: 122 MMHG | BODY MASS INDEX: 23.93 KG/M2 | WEIGHT: 139.4 LBS

## 2024-11-23 DIAGNOSIS — M53.3 ACUTE COCCYGEAL PAIN: Primary | ICD-10-CM

## 2024-11-23 PROCEDURE — 1157F ADVNC CARE PLAN IN RCRD: CPT | Performed by: NURSE PRACTITIONER

## 2024-11-23 PROCEDURE — 1160F RVW MEDS BY RX/DR IN RCRD: CPT | Performed by: NURSE PRACTITIONER

## 2024-11-23 PROCEDURE — 99214 OFFICE O/P EST MOD 30 MIN: CPT | Performed by: NURSE PRACTITIONER

## 2024-11-23 PROCEDURE — 3078F DIAST BP <80 MM HG: CPT | Performed by: NURSE PRACTITIONER

## 2024-11-23 PROCEDURE — 1123F ACP DISCUSS/DSCN MKR DOCD: CPT | Performed by: NURSE PRACTITIONER

## 2024-11-23 PROCEDURE — 1036F TOBACCO NON-USER: CPT | Performed by: NURSE PRACTITIONER

## 2024-11-23 PROCEDURE — 1159F MED LIST DOCD IN RCRD: CPT | Performed by: NURSE PRACTITIONER

## 2024-11-23 PROCEDURE — 3074F SYST BP LT 130 MM HG: CPT | Performed by: NURSE PRACTITIONER

## 2024-11-23 RX ORDER — VIT A/VIT C/VIT E/ZINC/COPPER 2148-113
1 TABLET ORAL DAILY
COMMUNITY

## 2024-11-23 RX ORDER — HYDROCODONE BITARTRATE AND ACETAMINOPHEN 5; 325 MG/1; MG/1
1 TABLET ORAL EVERY 6 HOURS PRN
Qty: 6 TABLET | Refills: 0 | Status: SHIPPED | OUTPATIENT
Start: 2024-11-23

## 2024-11-23 NOTE — PROGRESS NOTES
Subjective   Patient ID: Alexia Amaya is a 89 y.o. female who presents for Fall (Fell Thursday morning in the basement, getting a box off of the top shelf and lost balance and landed on buttock. Experiencing tailbone pain.).    HPI     On 11/21, she was grabbing a box off a top shelf in her basement and the box was heavier than she expected and she fell to the ground on her buttocks. She also hit her right forearm on the wall. She had immediate pain to her tailbone and the pain has persisted since. Not getting better or worse. It is worse with certain movements, such as turning in bed, standing up, or putting on her shoes or pants. The pain can get up to a 6/10. She has been taking Extra Strength Tylenol with a little relief. She has applied heat but this doesn't help. She denies any numbness, tingling, or pain radiation. She does have osteoporosis.     Review of Systems  ROS negative except as noted above in HPI.     Objective   /60 (BP Location: Right arm, Patient Position: Sitting)   Pulse 69   Wt 63.2 kg (139 lb 6.4 oz)   BMI 23.93 kg/m²     Physical Exam  General: Alert and oriented, in no acute distress. Appears stated age, well-nourished, and well hydrated  HEENT:  - Head: Normocephalic and atraumatic   - Eyes: EOMI, PERRLA  - ENT: Hearing grossly intact  Heart: RRR. No murmurs, clicks, or rubs  Lungs: Unlabored breathing. CTAB with no crackles, wheezes, or rhonchi  Abdomen: Normal BS in all 4 quadrants. Soft, non-tender, non-distended, with no masses  Extremities: Warm and well perfused. No edema. Normal peripheral pulses  Musculoskeletal: TTP of coccyx. Normal ROM. Normal gait and station  Neurological: Alert and oriented. No gross neurological deficits  Psychological: Appropriate mood and affect  Skin: No rash, abnormal lesions, cyanosis, or erythema    Assessment/Plan   Diagnoses and all orders for this visit:  Acute coccygeal pain  -     XR sacrum coccyx 2+ views; Future  -      HYDROcodone-acetaminophen (Norco) 5-325 mg tablet; Take 1 tablet by mouth every 6 hours if needed for severe pain (7 - 10).  -     Recommend ice application    SIR Hernadez-[facility]

## 2024-11-25 ENCOUNTER — HOSPITAL ENCOUNTER (OUTPATIENT)
Dept: RADIOLOGY | Facility: CLINIC | Age: 89
Discharge: HOME | End: 2024-11-25
Payer: MEDICARE

## 2024-11-25 DIAGNOSIS — M53.3 ACUTE COCCYGEAL PAIN: ICD-10-CM

## 2024-11-25 PROCEDURE — 72220 X-RAY EXAM SACRUM TAILBONE: CPT

## 2024-11-26 DIAGNOSIS — M53.3 ACUTE COCCYGEAL PAIN: Primary | ICD-10-CM

## 2024-11-27 ENCOUNTER — HOSPITAL ENCOUNTER (OUTPATIENT)
Dept: RADIOLOGY | Facility: CLINIC | Age: 89
Discharge: HOME | End: 2024-11-27
Payer: MEDICARE

## 2024-11-27 DIAGNOSIS — M53.3 ACUTE COCCYGEAL PAIN: ICD-10-CM

## 2024-11-27 PROCEDURE — 72192 CT PELVIS W/O DYE: CPT

## 2024-11-27 PROCEDURE — 72192 CT PELVIS W/O DYE: CPT | Performed by: RADIOLOGY

## 2024-11-30 DIAGNOSIS — E03.9 HYPOTHYROIDISM, UNSPECIFIED TYPE: ICD-10-CM

## 2024-12-02 RX ORDER — LEVOTHYROXINE SODIUM 50 UG/1
50 TABLET ORAL DAILY
Qty: 90 TABLET | Refills: 3 | Status: SHIPPED | OUTPATIENT
Start: 2024-12-02

## 2024-12-27 ENCOUNTER — LAB (OUTPATIENT)
Dept: LAB | Facility: LAB | Age: 89
End: 2024-12-27
Payer: MEDICARE

## 2024-12-27 DIAGNOSIS — I48.21 PERMANENT ATRIAL FIBRILLATION (MULTI): Primary | ICD-10-CM

## 2024-12-27 DIAGNOSIS — I50.32 CHRONIC DIASTOLIC (CONGESTIVE) HEART FAILURE: ICD-10-CM

## 2024-12-27 LAB
ANION GAP SERPL CALC-SCNC: 18 MMOL/L (ref 10–20)
BUN SERPL-MCNC: 21 MG/DL (ref 6–23)
CALCIUM SERPL-MCNC: 9.7 MG/DL (ref 8.6–10.6)
CHLORIDE SERPL-SCNC: 101 MMOL/L (ref 98–107)
CO2 SERPL-SCNC: 28 MMOL/L (ref 21–32)
CREAT SERPL-MCNC: 1.26 MG/DL (ref 0.5–1.05)
EGFRCR SERPLBLD CKD-EPI 2021: 41 ML/MIN/1.73M*2
GLUCOSE SERPL-MCNC: 91 MG/DL (ref 74–99)
POTASSIUM SERPL-SCNC: 4.2 MMOL/L (ref 3.5–5.3)
SODIUM SERPL-SCNC: 143 MMOL/L (ref 136–145)

## 2024-12-27 PROCEDURE — 80048 BASIC METABOLIC PNL TOTAL CA: CPT

## 2025-01-14 DIAGNOSIS — E78.5 HYPERLIPIDEMIA, UNSPECIFIED HYPERLIPIDEMIA TYPE: ICD-10-CM

## 2025-01-14 DIAGNOSIS — Z00.00 HEALTH MAINTENANCE EXAMINATION: ICD-10-CM

## 2025-01-14 DIAGNOSIS — I10 ESSENTIAL HYPERTENSION: ICD-10-CM

## 2025-01-14 RX ORDER — FOLIC ACID 1 MG/1
1 TABLET ORAL DAILY
Qty: 90 TABLET | Refills: 3 | Status: SHIPPED | OUTPATIENT
Start: 2025-01-14

## 2025-01-14 RX ORDER — AMLODIPINE BESYLATE 5 MG/1
5 TABLET ORAL DAILY
Qty: 90 TABLET | Refills: 3 | Status: SHIPPED | OUTPATIENT
Start: 2025-01-14

## 2025-01-14 RX ORDER — ATORVASTATIN CALCIUM 10 MG/1
10 TABLET, FILM COATED ORAL DAILY
Qty: 90 TABLET | Refills: 3 | Status: SHIPPED | OUTPATIENT
Start: 2025-01-14

## 2025-04-23 ENCOUNTER — TELEPHONE (OUTPATIENT)
Dept: PRIMARY CARE | Facility: CLINIC | Age: 89
End: 2025-04-23
Payer: MEDICARE

## 2025-04-24 DIAGNOSIS — E78.5 HYPERLIPIDEMIA, UNSPECIFIED HYPERLIPIDEMIA TYPE: ICD-10-CM

## 2025-04-24 DIAGNOSIS — E03.9 HYPOTHYROIDISM, UNSPECIFIED TYPE: ICD-10-CM

## 2025-04-24 DIAGNOSIS — I10 ESSENTIAL HYPERTENSION: ICD-10-CM

## 2025-08-12 ENCOUNTER — OFFICE VISIT (OUTPATIENT)
Dept: PRIMARY CARE | Facility: CLINIC | Age: 89
End: 2025-08-12
Payer: MEDICARE

## 2025-08-12 VITALS
HEART RATE: 72 BPM | TEMPERATURE: 97.3 F | HEIGHT: 64 IN | SYSTOLIC BLOOD PRESSURE: 124 MMHG | WEIGHT: 135 LBS | DIASTOLIC BLOOD PRESSURE: 60 MMHG | OXYGEN SATURATION: 96 % | BODY MASS INDEX: 23.05 KG/M2

## 2025-08-12 DIAGNOSIS — S51.012A SKIN TEAR OF LEFT ELBOW WITHOUT COMPLICATION, INITIAL ENCOUNTER: Primary | ICD-10-CM

## 2025-08-12 PROCEDURE — 1157F ADVNC CARE PLAN IN RCRD: CPT | Performed by: FAMILY MEDICINE

## 2025-08-12 PROCEDURE — 99213 OFFICE O/P EST LOW 20 MIN: CPT | Performed by: FAMILY MEDICINE

## 2025-08-12 PROCEDURE — 1123F ACP DISCUSS/DSCN MKR DOCD: CPT | Performed by: FAMILY MEDICINE

## 2025-08-12 PROCEDURE — 1159F MED LIST DOCD IN RCRD: CPT | Performed by: FAMILY MEDICINE

## 2025-08-12 PROCEDURE — 3074F SYST BP LT 130 MM HG: CPT | Performed by: FAMILY MEDICINE

## 2025-08-12 PROCEDURE — 90471 IMMUNIZATION ADMIN: CPT | Performed by: FAMILY MEDICINE

## 2025-08-12 PROCEDURE — 3078F DIAST BP <80 MM HG: CPT | Performed by: FAMILY MEDICINE

## 2025-08-12 PROCEDURE — 90715 TDAP VACCINE 7 YRS/> IM: CPT | Performed by: FAMILY MEDICINE

## 2025-08-12 ASSESSMENT — PATIENT HEALTH QUESTIONNAIRE - PHQ9
2. FEELING DOWN, DEPRESSED OR HOPELESS: NOT AT ALL
SUM OF ALL RESPONSES TO PHQ9 QUESTIONS 1 AND 2: 0
1. LITTLE INTEREST OR PLEASURE IN DOING THINGS: NOT AT ALL

## 2025-08-12 ASSESSMENT — ENCOUNTER SYMPTOMS
APPETITE CHANGE: 0
LOSS OF SENSATION IN FEET: 0
OCCASIONAL FEELINGS OF UNSTEADINESS: 0
DEPRESSION: 0
ARTHRALGIAS: 0
FACIAL ASYMMETRY: 0
WOUND: 1
COLOR CHANGE: 0
LIGHT-HEADEDNESS: 0
CONFUSION: 0

## 2025-08-23 ENCOUNTER — OFFICE VISIT (OUTPATIENT)
Dept: PRIMARY CARE | Facility: CLINIC | Age: 89
End: 2025-08-23
Payer: MEDICARE

## 2025-08-23 VITALS
DIASTOLIC BLOOD PRESSURE: 62 MMHG | OXYGEN SATURATION: 92 % | SYSTOLIC BLOOD PRESSURE: 128 MMHG | HEART RATE: 69 BPM | HEIGHT: 64 IN | BODY MASS INDEX: 22.96 KG/M2 | WEIGHT: 134.5 LBS | TEMPERATURE: 98.1 F

## 2025-08-23 DIAGNOSIS — B34.9 VIRAL ILLNESS: Primary | ICD-10-CM

## 2025-08-23 PROCEDURE — 3078F DIAST BP <80 MM HG: CPT | Performed by: FAMILY MEDICINE

## 2025-08-23 PROCEDURE — 1159F MED LIST DOCD IN RCRD: CPT | Performed by: FAMILY MEDICINE

## 2025-08-23 PROCEDURE — 3074F SYST BP LT 130 MM HG: CPT | Performed by: FAMILY MEDICINE

## 2025-08-23 PROCEDURE — 99213 OFFICE O/P EST LOW 20 MIN: CPT | Performed by: FAMILY MEDICINE

## 2025-08-23 PROCEDURE — 1036F TOBACCO NON-USER: CPT | Performed by: FAMILY MEDICINE

## 2025-08-23 ASSESSMENT — ENCOUNTER SYMPTOMS
FACIAL ASYMMETRY: 0
SORE THROAT: 0
DEPRESSION: 0
FLANK PAIN: 0
NECK STIFFNESS: 1
SHORTNESS OF BREATH: 0
SINUS PAIN: 0
LOSS OF SENSATION IN FEET: 0
CHILLS: 0
WEAKNESS: 0
CHEST TIGHTNESS: 0
ABDOMINAL PAIN: 0
DYSURIA: 0
APPETITE CHANGE: 0
DIARRHEA: 1
FREQUENCY: 0
EYE ITCHING: 0
EYE REDNESS: 0
FATIGUE: 1
OCCASIONAL FEELINGS OF UNSTEADINESS: 0
APNEA: 0

## 2025-08-24 LAB — QUEST FLEXITEST2 RESULTS:: NORMAL

## 2025-09-02 ENCOUNTER — APPOINTMENT (OUTPATIENT)
Dept: PRIMARY CARE | Facility: CLINIC | Age: 89
End: 2025-09-02
Payer: MEDICARE

## 2025-09-02 PROBLEM — N30.90 CYSTITIS: Status: ACTIVE | Noted: 2025-09-02

## 2025-09-02 PROBLEM — R53.83 OTHER FATIGUE: Status: ACTIVE | Noted: 2023-06-02

## 2025-09-02 PROBLEM — M19.90 ARTHRITIS: Status: ACTIVE | Noted: 2025-09-02

## 2025-09-02 ASSESSMENT — ENCOUNTER SYMPTOMS
CHEST TIGHTNESS: 0
ABDOMINAL PAIN: 0
POLYPHAGIA: 0
PALPITATIONS: 0
WEAKNESS: 0
DIARRHEA: 1
APPETITE CHANGE: 0
FLANK PAIN: 0
BLOOD IN STOOL: 0
EYE PAIN: 0
FREQUENCY: 0
SINUS PAIN: 0
SHORTNESS OF BREATH: 1
EYE REDNESS: 0
NECK STIFFNESS: 1
CHILLS: 0
EYE ITCHING: 0
NAUSEA: 0
DYSURIA: 0
ABDOMINAL DISTENTION: 0
APNEA: 0
FATIGUE: 1
FACIAL ASYMMETRY: 0
SORE THROAT: 0

## 2025-09-03 ENCOUNTER — RESULTS FOLLOW-UP (OUTPATIENT)
Dept: PRIMARY CARE | Facility: CLINIC | Age: 89
End: 2025-09-03
Payer: MEDICARE

## 2025-09-03 DIAGNOSIS — N30.90 CYSTITIS: Primary | ICD-10-CM

## 2025-09-03 RX ORDER — CEFDINIR 300 MG/1
300 CAPSULE ORAL 2 TIMES DAILY
Qty: 10 CAPSULE | Refills: 0 | Status: SHIPPED | OUTPATIENT
Start: 2025-09-03 | End: 2025-09-09

## 2025-09-05 DIAGNOSIS — M35.3 PMR (POLYMYALGIA RHEUMATICA) (MULTI): Primary | ICD-10-CM

## 2025-09-05 RX ORDER — PREDNISONE 10 MG/1
30 TABLET ORAL DAILY
Qty: 21 TABLET | Refills: 0 | Status: SHIPPED | OUTPATIENT
Start: 2025-09-05 | End: 2025-09-12

## 2025-09-06 DIAGNOSIS — A69.20 LYME DISEASE: Primary | ICD-10-CM

## 2025-09-06 RX ORDER — DOXYCYCLINE 100 MG/1
100 CAPSULE ORAL 2 TIMES DAILY
Qty: 42 CAPSULE | Refills: 0 | Status: SHIPPED | OUTPATIENT
Start: 2025-09-06 | End: 2025-09-27

## 2025-10-14 ENCOUNTER — APPOINTMENT (OUTPATIENT)
Dept: PRIMARY CARE | Facility: CLINIC | Age: 89
End: 2025-10-14
Payer: MEDICARE

## 2025-10-23 ENCOUNTER — APPOINTMENT (OUTPATIENT)
Dept: PRIMARY CARE | Facility: CLINIC | Age: 89
End: 2025-10-23
Payer: MEDICARE